# Patient Record
Sex: MALE | Race: WHITE | NOT HISPANIC OR LATINO | Employment: OTHER | URBAN - METROPOLITAN AREA
[De-identification: names, ages, dates, MRNs, and addresses within clinical notes are randomized per-mention and may not be internally consistent; named-entity substitution may affect disease eponyms.]

---

## 2023-04-04 ENCOUNTER — OFFICE VISIT (OUTPATIENT)
Dept: PRIMARY CARE | Facility: CLINIC | Age: 73
End: 2023-04-04
Payer: MEDICARE

## 2023-04-04 VITALS
WEIGHT: 222 LBS | BODY MASS INDEX: 28.49 KG/M2 | SYSTOLIC BLOOD PRESSURE: 142 MMHG | DIASTOLIC BLOOD PRESSURE: 84 MMHG | HEIGHT: 74 IN

## 2023-04-04 DIAGNOSIS — I25.10 CORONARY ARTERY DISEASE INVOLVING NATIVE HEART WITHOUT ANGINA PECTORIS, UNSPECIFIED VESSEL OR LESION TYPE: ICD-10-CM

## 2023-04-04 DIAGNOSIS — I48.11 LONGSTANDING PERSISTENT ATRIAL FIBRILLATION (MULTI): ICD-10-CM

## 2023-04-04 DIAGNOSIS — E78.00 HYPERCHOLESTEROLEMIA: ICD-10-CM

## 2023-04-04 DIAGNOSIS — Z00.00 ROUTINE GENERAL MEDICAL EXAMINATION AT HEALTH CARE FACILITY: Primary | ICD-10-CM

## 2023-04-04 DIAGNOSIS — S31.000A WOUND OF SACRAL REGION, INITIAL ENCOUNTER: ICD-10-CM

## 2023-04-04 PROBLEM — R06.02 SOB (SHORTNESS OF BREATH) ON EXERTION: Status: ACTIVE | Noted: 2023-04-04

## 2023-04-04 PROBLEM — I21.4 NON-ST ELEVATION MYOCARDIAL INFARCTION (NSTEMI) (MULTI): Status: ACTIVE | Noted: 2023-04-04

## 2023-04-04 PROBLEM — I21.19 ST ELEVATION (STEMI) MYOCARDIAL INFARCTION INVOLVING OTHER CORONARY ARTERY OF INFERIOR WALL (MULTI): Status: ACTIVE | Noted: 2023-04-04

## 2023-04-04 PROBLEM — I10 BENIGN ESSENTIAL HTN: Status: ACTIVE | Noted: 2023-04-04

## 2023-04-04 PROBLEM — F32.A DEPRESSION: Status: ACTIVE | Noted: 2023-04-04

## 2023-04-04 PROBLEM — I48.91 ATRIAL FIBRILLATION (MULTI): Status: ACTIVE | Noted: 2023-04-04

## 2023-04-04 PROBLEM — I71.20 THORACIC AORTIC ANEURYSM WITHOUT RUPTURE (CMS-HCC): Status: ACTIVE | Noted: 2023-04-04

## 2023-04-04 PROBLEM — I71.40 AAA (ABDOMINAL AORTIC ANEURYSM) (CMS-HCC): Status: ACTIVE | Noted: 2023-04-04

## 2023-04-04 PROCEDURE — G0439 PPPS, SUBSEQ VISIT: HCPCS | Performed by: STUDENT IN AN ORGANIZED HEALTH CARE EDUCATION/TRAINING PROGRAM

## 2023-04-04 PROCEDURE — 1157F ADVNC CARE PLAN IN RCRD: CPT | Performed by: STUDENT IN AN ORGANIZED HEALTH CARE EDUCATION/TRAINING PROGRAM

## 2023-04-04 PROCEDURE — 3079F DIAST BP 80-89 MM HG: CPT | Performed by: STUDENT IN AN ORGANIZED HEALTH CARE EDUCATION/TRAINING PROGRAM

## 2023-04-04 PROCEDURE — 99214 OFFICE O/P EST MOD 30 MIN: CPT | Performed by: STUDENT IN AN ORGANIZED HEALTH CARE EDUCATION/TRAINING PROGRAM

## 2023-04-04 PROCEDURE — 3077F SYST BP >= 140 MM HG: CPT | Performed by: STUDENT IN AN ORGANIZED HEALTH CARE EDUCATION/TRAINING PROGRAM

## 2023-04-04 PROCEDURE — 1159F MED LIST DOCD IN RCRD: CPT | Performed by: STUDENT IN AN ORGANIZED HEALTH CARE EDUCATION/TRAINING PROGRAM

## 2023-04-04 PROCEDURE — 1170F FXNL STATUS ASSESSED: CPT | Performed by: STUDENT IN AN ORGANIZED HEALTH CARE EDUCATION/TRAINING PROGRAM

## 2023-04-04 PROCEDURE — 1036F TOBACCO NON-USER: CPT | Performed by: STUDENT IN AN ORGANIZED HEALTH CARE EDUCATION/TRAINING PROGRAM

## 2023-04-04 RX ORDER — EZETIMIBE 10 MG/1
10 TABLET ORAL DAILY
COMMUNITY

## 2023-04-04 RX ORDER — APIXABAN 5 MG/1
1 TABLET, FILM COATED ORAL 2 TIMES DAILY
COMMUNITY
Start: 2023-03-28 | End: 2023-12-21 | Stop reason: ALTCHOICE

## 2023-04-04 RX ORDER — NEBIVOLOL 10 MG/1
10 TABLET ORAL DAILY
COMMUNITY
End: 2023-05-30

## 2023-04-04 RX ORDER — ATORVASTATIN CALCIUM 80 MG/1
80 TABLET, FILM COATED ORAL DAILY
COMMUNITY
End: 2023-06-22

## 2023-04-04 RX ORDER — ASPIRIN 81 MG/1
1 TABLET ORAL DAILY
COMMUNITY
Start: 2022-07-20 | End: 2023-12-21 | Stop reason: ALTCHOICE

## 2023-04-04 RX ORDER — RAMIPRIL 10 MG/1
10 CAPSULE ORAL 2 TIMES DAILY
COMMUNITY
End: 2023-06-19

## 2023-04-04 RX ORDER — VENLAFAXINE HYDROCHLORIDE 150 MG/1
150 CAPSULE, EXTENDED RELEASE ORAL DAILY
COMMUNITY
End: 2023-07-05

## 2023-04-04 ASSESSMENT — ACTIVITIES OF DAILY LIVING (ADL)
DOING_HOUSEWORK: INDEPENDENT
TAKING_MEDICATION: INDEPENDENT
BATHING: INDEPENDENT
GROCERY_SHOPPING: INDEPENDENT
MANAGING_FINANCES: INDEPENDENT
DRESSING: INDEPENDENT

## 2023-04-04 ASSESSMENT — ENCOUNTER SYMPTOMS
OCCASIONAL FEELINGS OF UNSTEADINESS: 0
LOSS OF SENSATION IN FEET: 0
DEPRESSION: 0

## 2023-04-04 NOTE — PROGRESS NOTES
"Subjective   Reason for Visit: Kevin Mike is an 72 y.o. male here for a Medicare Wellness visit.     Past Medical, Surgical, and Family History reviewed and updated in chart.    Reviewed all medications by prescribing practitioner or clinical pharmacist (such as prescriptions, OTCs, herbal therapies and supplements) and documented in the medical record.    HPI    Patient Care Team:  Kevin Del Rio MD as PCP - General     Review of Systems    Objective   Vitals:  /84   Ht 1.88 m (6' 2\")   Wt 101 kg (222 lb)   BMI 28.50 kg/m²       Physical Exam    Assessment/Plan   Problem List Items Addressed This Visit    None  Visit Diagnoses     Routine general medical examination at health care facility    -  Primary    Wound of sacral region, initial encounter        Relevant Orders    Referral to Wound Clinic             "

## 2023-04-04 NOTE — PROGRESS NOTES
"Subjective   Patient ID: Kevin Mike is a 72 y.o. male who presents for Medicare Annual Wellness Visit Subsequent (Sore on right buttock, x 1 month/).    HPI   Patient is here for a follow up visit.     Completed his coloscopy and had multiple non-cancerous polyps.     He has noticed a wound on his right gluteal region. He has not been able to get to heal over the last month. It has not been draining, denies trauma, and painful with extended periods of sitting. No history of gluteal wounds.     Review of Systems  12-point ROS reviewed and was negative unless otherwise noted in HPI.    Objective   /84   Ht 1.88 m (6' 2\")   Wt 101 kg (222 lb)   BMI 28.50 kg/m²     Physical Exam  GEN: conversant, NAD  HEENT: PERRL, EOMI, wearing a mask  NECK: supple, no carotid bruits appreciated b/l  CV: S1, S2, RRR  PULM: CTAB  ABD: soft, NT, ND  SKIN: 1cm in diameter, pressure ulcer on right gluteal fold with surrounding erythema  NEURO: no new gross focal deficits  EXT: no sig LE edema  PSYCH: appropriate affect      Assessment/Plan     - CAD s/p CABG in 1990s  - AAA s/p repair 2008  - Afib s/p Watchman 2022  - Thoracic aortic aneurysm being monitored by cardiology and thoracic surgery  - HLD  - HTN  - obesity  - depression  - h/o cigarette smoking  -Colon polyps - repeat colonoscopy 2026  -Stage 2 pressure ulcer with surrounding deep tissue injury     PLAN:  Available records and recent clinic notes reviewed.   - Completed colonoscopy, 5 poyps, repeat in 3 years, 2026.   -Referral to Wound Care for gluteal wound. Discussed weight off loading.   - Planning for cardiac ablation 4/18/2023 for a fib.      #Health maintenance: Cologuard screening repeat 2026.  He received Pneumovax, Shingrix, COVID-19 vaccines. Advised yearly flu shot.     RTC 6 mo or PRN    Trainee role: Resident    I saw and evaluated the patient. I personally obtained the key and critical portions of the history and physical exam or was physically present " for key and critical portions performed by the trainee. I reviewed the trainee's documentation and discussed the patient with the trainee. I agree with the trainee's medical decision making as documented on the trainee's notes.    Kevin Del Rio M.D.

## 2023-04-17 LAB
ANION GAP IN SER/PLAS: 11 MMOL/L (ref 10–20)
CALCIUM (MG/DL) IN SER/PLAS: 9.3 MG/DL (ref 8.6–10.3)
CARBON DIOXIDE, TOTAL (MMOL/L) IN SER/PLAS: 28 MMOL/L (ref 21–32)
CHLORIDE (MMOL/L) IN SER/PLAS: 98 MMOL/L (ref 98–107)
CREATININE (MG/DL) IN SER/PLAS: 0.99 MG/DL (ref 0.5–1.3)
ERYTHROCYTE DISTRIBUTION WIDTH (RATIO) BY AUTOMATED COUNT: 14.1 % (ref 11.5–14.5)
ERYTHROCYTE MEAN CORPUSCULAR HEMOGLOBIN CONCENTRATION (G/DL) BY AUTOMATED: 33.4 G/DL (ref 32–36)
ERYTHROCYTE MEAN CORPUSCULAR VOLUME (FL) BY AUTOMATED COUNT: 99 FL (ref 80–100)
ERYTHROCYTES (10*6/UL) IN BLOOD BY AUTOMATED COUNT: 4.89 X10E12/L (ref 4.5–5.9)
GFR MALE: 81 ML/MIN/1.73M2
GLUCOSE (MG/DL) IN SER/PLAS: 89 MG/DL (ref 74–99)
HEMATOCRIT (%) IN BLOOD BY AUTOMATED COUNT: 48.2 % (ref 41–52)
HEMOGLOBIN (G/DL) IN BLOOD: 16.1 G/DL (ref 13.5–17.5)
LEUKOCYTES (10*3/UL) IN BLOOD BY AUTOMATED COUNT: 6.7 X10E9/L (ref 4.4–11.3)
NRBC (PER 100 WBCS) BY AUTOMATED COUNT: 0 /100 WBC (ref 0–0)
PLATELETS (10*3/UL) IN BLOOD AUTOMATED COUNT: 121 X10E9/L (ref 150–450)
POTASSIUM (MMOL/L) IN SER/PLAS: 4.8 MMOL/L (ref 3.5–5.3)
SODIUM (MMOL/L) IN SER/PLAS: 132 MMOL/L (ref 136–145)
UREA NITROGEN (MG/DL) IN SER/PLAS: 22 MG/DL (ref 6–23)

## 2023-04-18 ENCOUNTER — HOSPITAL ENCOUNTER (OUTPATIENT)
Dept: DATA CONVERSION | Facility: HOSPITAL | Age: 73
End: 2023-04-18
Attending: INTERNAL MEDICINE | Admitting: INTERNAL MEDICINE
Payer: MEDICARE

## 2023-04-18 DIAGNOSIS — Z86.79 PERSONAL HISTORY OF OTHER DISEASES OF THE CIRCULATORY SYSTEM: ICD-10-CM

## 2023-04-18 DIAGNOSIS — Z95.818 PRESENCE OF OTHER CARDIAC IMPLANTS AND GRAFTS: ICD-10-CM

## 2023-04-18 DIAGNOSIS — R06.02 SHORTNESS OF BREATH: ICD-10-CM

## 2023-04-18 DIAGNOSIS — Z95.5 PRESENCE OF CORONARY ANGIOPLASTY IMPLANT AND GRAFT: ICD-10-CM

## 2023-04-18 DIAGNOSIS — E78.5 HYPERLIPIDEMIA, UNSPECIFIED: ICD-10-CM

## 2023-04-18 DIAGNOSIS — I25.119 ATHEROSCLEROTIC HEART DISEASE OF NATIVE CORONARY ARTERY WITH UNSPECIFIED ANGINA PECTORIS (CMS-HCC): ICD-10-CM

## 2023-04-18 DIAGNOSIS — I10 ESSENTIAL (PRIMARY) HYPERTENSION: ICD-10-CM

## 2023-04-18 DIAGNOSIS — I25.2 OLD MYOCARDIAL INFARCTION: ICD-10-CM

## 2023-04-18 DIAGNOSIS — Z79.82 LONG TERM (CURRENT) USE OF ASPIRIN: ICD-10-CM

## 2023-04-18 DIAGNOSIS — I48.19 OTHER PERSISTENT ATRIAL FIBRILLATION (MULTI): ICD-10-CM

## 2023-04-18 DIAGNOSIS — I48.91 UNSPECIFIED ATRIAL FIBRILLATION (MULTI): ICD-10-CM

## 2023-04-18 DIAGNOSIS — Z79.01 LONG TERM (CURRENT) USE OF ANTICOAGULANTS: ICD-10-CM

## 2023-04-18 DIAGNOSIS — Z87.891 PERSONAL HISTORY OF NICOTINE DEPENDENCE: ICD-10-CM

## 2023-04-18 DIAGNOSIS — Z95.1 PRESENCE OF AORTOCORONARY BYPASS GRAFT: ICD-10-CM

## 2023-04-24 LAB
POC ACTIVATED CLOTTING TIME HIGH RANGE: 324 SECONDS (ref 96–152)
POC ACTIVATED CLOTTING TIME HIGH RANGE: 355 SECONDS (ref 96–152)
POC ACTIVATED CLOTTING TIME HIGH RANGE: 365 SECONDS (ref 96–152)

## 2023-05-02 ENCOUNTER — OFFICE VISIT (OUTPATIENT)
Dept: PRIMARY CARE | Facility: CLINIC | Age: 73
End: 2023-05-02
Payer: MEDICARE

## 2023-05-02 VITALS — SYSTOLIC BLOOD PRESSURE: 158 MMHG | DIASTOLIC BLOOD PRESSURE: 96 MMHG

## 2023-05-02 DIAGNOSIS — E78.00 HYPERCHOLESTEROLEMIA: ICD-10-CM

## 2023-05-02 DIAGNOSIS — M54.32 SCIATICA OF LEFT SIDE: ICD-10-CM

## 2023-05-02 DIAGNOSIS — I10 BENIGN ESSENTIAL HTN: ICD-10-CM

## 2023-05-02 DIAGNOSIS — M25.552 PAIN OF LEFT HIP: Primary | ICD-10-CM

## 2023-05-02 PROBLEM — M54.30 SCIATICA: Status: ACTIVE | Noted: 2023-05-02

## 2023-05-02 PROBLEM — M25.559 HIP PAIN: Status: ACTIVE | Noted: 2023-05-02

## 2023-05-02 PROCEDURE — 99213 OFFICE O/P EST LOW 20 MIN: CPT | Performed by: STUDENT IN AN ORGANIZED HEALTH CARE EDUCATION/TRAINING PROGRAM

## 2023-05-02 PROCEDURE — 3077F SYST BP >= 140 MM HG: CPT | Performed by: STUDENT IN AN ORGANIZED HEALTH CARE EDUCATION/TRAINING PROGRAM

## 2023-05-02 PROCEDURE — 3080F DIAST BP >= 90 MM HG: CPT | Performed by: STUDENT IN AN ORGANIZED HEALTH CARE EDUCATION/TRAINING PROGRAM

## 2023-05-02 PROCEDURE — 1160F RVW MEDS BY RX/DR IN RCRD: CPT | Performed by: STUDENT IN AN ORGANIZED HEALTH CARE EDUCATION/TRAINING PROGRAM

## 2023-05-02 PROCEDURE — 1036F TOBACCO NON-USER: CPT | Performed by: STUDENT IN AN ORGANIZED HEALTH CARE EDUCATION/TRAINING PROGRAM

## 2023-05-02 PROCEDURE — 1157F ADVNC CARE PLAN IN RCRD: CPT | Performed by: STUDENT IN AN ORGANIZED HEALTH CARE EDUCATION/TRAINING PROGRAM

## 2023-05-02 PROCEDURE — 1159F MED LIST DOCD IN RCRD: CPT | Performed by: STUDENT IN AN ORGANIZED HEALTH CARE EDUCATION/TRAINING PROGRAM

## 2023-05-02 RX ORDER — METHYLPREDNISOLONE 4 MG/1
TABLET ORAL
Qty: 21 TABLET | Refills: 0 | Status: SHIPPED | OUTPATIENT
Start: 2023-05-02 | End: 2023-05-09

## 2023-05-02 RX ORDER — CYCLOBENZAPRINE HCL 5 MG
5 TABLET ORAL 3 TIMES DAILY PRN
Qty: 30 TABLET | Refills: 0 | Status: SHIPPED | OUTPATIENT
Start: 2023-05-02 | End: 2023-07-01

## 2023-05-02 RX ORDER — LIDOCAINE 50 MG/G
1 PATCH TOPICAL DAILY
Qty: 30 PATCH | Refills: 0 | Status: SHIPPED | OUTPATIENT
Start: 2023-05-02

## 2023-05-02 ASSESSMENT — ENCOUNTER SYMPTOMS
PSYCHIATRIC NEGATIVE: 1
GASTROINTESTINAL NEGATIVE: 1
MUSCULOSKELETAL NEGATIVE: 1
RESPIRATORY NEGATIVE: 1
NUMBNESS: 1
CARDIOVASCULAR NEGATIVE: 1
CONSTITUTIONAL NEGATIVE: 1

## 2023-05-02 NOTE — PROGRESS NOTES
Subjective   Patient ID: Kevin Mike is a 72 y.o. male who presents for Hip Pain (Left hip discomfort,x 5 days).    Patient seen on sick visit.  Regards that he is having some hip pain that started all of a sudden.  Denies any trauma or injuries to the area however it is sometimes excruciating pain.  States that worsens with ambulation, regards a shooting sensation down his leg to about his posterior knee.  States the pain mostly resides in his buttocks, denies any bowel or bladder incontinence or any neurological difficulties.  Denies any additional issues or concerns.         Review of Systems   Constitutional: Negative.    HENT: Negative.     Respiratory: Negative.     Cardiovascular: Negative.    Gastrointestinal: Negative.    Genitourinary: Negative.    Musculoskeletal: Negative.    Skin: Negative.    Neurological:  Positive for syncope and numbness.   Psychiatric/Behavioral: Negative.         Objective   BP (!) 158/96     Physical Exam  Vitals and nursing note reviewed.   Constitutional:       General: He is not in acute distress.     Appearance: Normal appearance.   HENT:      Mouth/Throat:      Mouth: Mucous membranes are moist.      Pharynx: Oropharynx is clear. No oropharyngeal exudate.   Eyes:      Extraocular Movements: Extraocular movements intact.      Pupils: Pupils are equal, round, and reactive to light.   Cardiovascular:      Rate and Rhythm: Normal rate and regular rhythm.      Pulses: Normal pulses.      Heart sounds: Normal heart sounds. No murmur heard.  Pulmonary:      Effort: Pulmonary effort is normal. No respiratory distress.   Abdominal:      General: Abdomen is flat. Bowel sounds are normal. There is no distension.      Tenderness: There is no abdominal tenderness.   Musculoskeletal:         General: Normal range of motion.      Right lower leg: No edema.      Left lower leg: No edema.      Comments: SLR pos on L pain to palpitaiton over posterior spine to the L, pain over area of  piriformis   Skin:     General: Skin is warm and dry.      Capillary Refill: Capillary refill takes less than 2 seconds.   Neurological:      General: No focal deficit present.      Mental Status: He is alert. Mental status is at baseline.      Cranial Nerves: No cranial nerve deficit.      Motor: No weakness.   Psychiatric:         Mood and Affect: Mood normal.         Thought Content: Thought content normal.         Assessment/Plan   Problem List Items Addressed This Visit          Circulatory    Benign essential HTN       Musculoskeletal    Hip pain - Primary    Relevant Medications    methylPREDNISolone (Medrol Dospak) 4 mg tablets    cyclobenzaprine (Flexeril) 5 mg tablet    lidocaine (Lidoderm) 5 % patch    Other Relevant Orders    XR lumbar spine complete 4+ views       Other    Hypercholesterolemia    Sciatica    Relevant Orders    Referral to Pain Medicine     Patient seen on sick evaluation    #Hip pain  #Sciatica  Patient signs and symptoms and physical exam indicative of sciatica-like pain, recommend conservative measures, stretching exercises, ice, Medrol Dosepak cyclobenzaprine as needed.  As well as anti-inflammatories.  No alarm signs noted on exam, recommend follow-up with pain management for injection if no improvement.  Due to chronicity of symptoms, obtain x-ray of hip as well as lower spine.    Return to care as previous scheduled or as needed we will call if symptoms significantly worsen.

## 2023-05-08 ENCOUNTER — DOCUMENTATION (OUTPATIENT)
Dept: PRIMARY CARE | Facility: CLINIC | Age: 73
End: 2023-05-08
Payer: MEDICARE

## 2023-05-08 ENCOUNTER — PATIENT OUTREACH (OUTPATIENT)
Dept: PRIMARY CARE | Facility: CLINIC | Age: 73
End: 2023-05-08
Payer: MEDICARE

## 2023-05-08 RX ORDER — OXYCODONE AND ACETAMINOPHEN 5; 325 MG/1; MG/1
TABLET ORAL
COMMUNITY
Start: 2023-05-07 | End: 2023-12-21 | Stop reason: ALTCHOICE

## 2023-05-08 NOTE — PROGRESS NOTES
Discharge Facility:  [facility] Diagnosis: Atrial fibrillation with RVR; Elevated troponin; Lower back pain   Admission Date:  5/6/23   Discharge Date:   5/7/23    PCP Appointment Date:   5/15/23   Specialist Appointment Date:   Ever MUÑIZ-  Hospital Encounter and Summary: Linked   See discharge assessment below for further details      Engagement  Call Start Time: 1030 (5/8/2023 10:52 AM)    Medications  Medications reviewed with patient/caregiver?: Yes (5/8/2023 10:52 AM)  Is the patient having any side effects they believe may be caused by any medication additions or changes?: No (5/8/2023 10:52 AM)  Does the patient have all medications ordered at discharge?: Yes (5/8/2023 10:52 AM)  Prescription Comments: script for acetaminophen-oxycodone , continued eliquis,-- pharm reviewed meds per [facility]IE (5/8/2023 10:52 AM)  Is the patient taking all medications as directed (includes completed medication regime)?: Yes (5/8/2023 10:52 AM)    Appointments  Does the patient have a primary care provider?: Yes (5/8/2023 10:52 AM)  Care Management Interventions: Verified appointment date/time/provider (5/8/2023 10:52 AM)  Care Management Interventions: Advised patient to keep appointment (5/8/2023 10:52 AM)    Self Management  What is the home health agency?: denies need (5/8/2023 10:52 AM)  Has home health visited the patient within 72 hours of discharge?: Not applicable (5/8/2023 10:52 AM)    Patient Teaching  Does the patient have access to their discharge instructions?: Yes (5/8/2023 10:52 AM)  Care Management Interventions: Reviewed instructions with patient (5/8/2023 10:52 AM)  Is the patient/caregiver able to teach back the hierarchy of who to call/visit for symptoms/problems? PCP, Specialist, Home Health nurse, Urgent Care, ED, 911: Yes (5/8/2023 10:52 AM)    Wrap Up  Is the patient/caregiver familiar with Advance Care Planning?: Yes (5/8/2023 10:52 AM)  Would the patient like more  information on Advance Care Planning?: No (5/8/2023 10:52 AM)  Wrap Up Additional Comments: pt states he is doing well at home pcp appt scheduled for 5/15. contact info provided for any concerns or questions (5/8/2023 10:52 AM)  Call End Time: 1038 (5/8/2023 10:52 AM)

## 2023-05-15 ENCOUNTER — APPOINTMENT (OUTPATIENT)
Dept: PRIMARY CARE | Facility: CLINIC | Age: 73
End: 2023-05-15
Payer: MEDICARE

## 2023-05-23 ENCOUNTER — APPOINTMENT (OUTPATIENT)
Dept: PRIMARY CARE | Facility: CLINIC | Age: 73
End: 2023-05-23
Payer: MEDICARE

## 2023-05-27 DIAGNOSIS — Z00.00 ENCOUNTER FOR GENERAL ADULT MEDICAL EXAMINATION WITHOUT ABNORMAL FINDINGS: ICD-10-CM

## 2023-05-30 RX ORDER — NEBIVOLOL 10 MG/1
TABLET ORAL
Qty: 90 TABLET | Refills: 1 | Status: SHIPPED | OUTPATIENT
Start: 2023-05-30 | End: 2023-11-27

## 2023-06-01 ENCOUNTER — PATIENT OUTREACH (OUTPATIENT)
Dept: CARE COORDINATION | Facility: CLINIC | Age: 73
End: 2023-06-01
Payer: MEDICARE

## 2023-06-01 NOTE — PROGRESS NOTES
Call regarding  hospitalization. No pcp appt  pt having surgery 6/14/23    At time of outreach call the patient feels as if their condition is back to baseline) since last visit.  Contact info provided.. pt did not see pcp for tcm.

## 2023-06-14 ENCOUNTER — HOSPITAL ENCOUNTER (OUTPATIENT)
Dept: DATA CONVERSION | Facility: HOSPITAL | Age: 73
End: 2023-06-15
Attending: STUDENT IN AN ORGANIZED HEALTH CARE EDUCATION/TRAINING PROGRAM | Admitting: STUDENT IN AN ORGANIZED HEALTH CARE EDUCATION/TRAINING PROGRAM
Payer: MEDICARE

## 2023-06-14 DIAGNOSIS — Z79.52 LONG TERM (CURRENT) USE OF SYSTEMIC STEROIDS: ICD-10-CM

## 2023-06-14 DIAGNOSIS — E78.5 HYPERLIPIDEMIA, UNSPECIFIED: ICD-10-CM

## 2023-06-14 DIAGNOSIS — F32.A DEPRESSION, UNSPECIFIED: ICD-10-CM

## 2023-06-14 DIAGNOSIS — M48.061 SPINAL STENOSIS, LUMBAR REGION WITHOUT NEUROGENIC CLAUDICATION: ICD-10-CM

## 2023-06-14 DIAGNOSIS — M71.38 OTHER BURSAL CYST, OTHER SITE: ICD-10-CM

## 2023-06-14 DIAGNOSIS — I25.2 OLD MYOCARDIAL INFARCTION: ICD-10-CM

## 2023-06-14 DIAGNOSIS — F12.10 CANNABIS ABUSE, UNCOMPLICATED: ICD-10-CM

## 2023-06-14 DIAGNOSIS — I10 ESSENTIAL (PRIMARY) HYPERTENSION: ICD-10-CM

## 2023-06-14 DIAGNOSIS — I42.9 CARDIOMYOPATHY, UNSPECIFIED (MULTI): ICD-10-CM

## 2023-06-14 DIAGNOSIS — Z87.891 PERSONAL HISTORY OF NICOTINE DEPENDENCE: ICD-10-CM

## 2023-06-14 DIAGNOSIS — M54.16 RADICULOPATHY, LUMBAR REGION: ICD-10-CM

## 2023-06-14 DIAGNOSIS — Z95.5 PRESENCE OF CORONARY ANGIOPLASTY IMPLANT AND GRAFT: ICD-10-CM

## 2023-06-14 DIAGNOSIS — I25.10 ATHEROSCLEROTIC HEART DISEASE OF NATIVE CORONARY ARTERY WITHOUT ANGINA PECTORIS: ICD-10-CM

## 2023-06-14 DIAGNOSIS — Z95.1 PRESENCE OF AORTOCORONARY BYPASS GRAFT: ICD-10-CM

## 2023-06-14 LAB
PATIENT TEMPERATURE: 37 DEGREES C
POCT ANION GAP, ARTERIAL: 9 MMOL/L (ref 10–25)
POCT BASE EXCESS, ARTERIAL: -0.6 MMOL/L (ref -2–3)
POCT CHLORIDE, ARTERIAL: 104 MMOL/L (ref 98–107)
POCT GLUCOSE, ARTERIAL: 116 MG/DL (ref 74–99)
POCT HCO3, ARTERIAL: 25.4 MMOL/L (ref 22–26)
POCT HEMATOCRIT, ARTERIAL: 40 % (ref 41–52)
POCT HEMOGLOBIN, ARTERIAL: 13.3 G/DL (ref 13.5–17.5)
POCT IONIZED CALCIUM, ARTERIAL: 1.23 MMOL/L (ref 1.1–1.33)
POCT LACTATE, ARTERIAL: 1.1 MMOL/L (ref 0.4–2)
POCT OXY HEMOGLOBIN, ARTERIAL: 97.1 % (ref 94–98)
POCT PCO2, ARTERIAL: 46 MMHG (ref 38–42)
POCT PH, ARTERIAL: 7.35 (ref 7.38–7.42)
POCT PO2, ARTERIAL: 190 MMHG (ref 85–95)
POCT POTASSIUM, ARTERIAL: 4.8 MMOL/L (ref 3.5–5.3)
POCT SO2, ARTERIAL: 100 % (ref 94–100)
POCT SODIUM, ARTERIAL: 134 MMOL/L (ref 136–145)

## 2023-06-15 DIAGNOSIS — Z00.00 ENCOUNTER FOR GENERAL ADULT MEDICAL EXAMINATION WITHOUT ABNORMAL FINDINGS: ICD-10-CM

## 2023-06-15 LAB
ALBUMIN (G/DL) IN SER/PLAS: 3.8 G/DL (ref 3.4–5)
ANION GAP IN SER/PLAS: 16 MMOL/L (ref 10–20)
CALCIUM (MG/DL) IN SER/PLAS: 9.3 MG/DL (ref 8.6–10.6)
CARBON DIOXIDE, TOTAL (MMOL/L) IN SER/PLAS: 25 MMOL/L (ref 21–32)
CHLORIDE (MMOL/L) IN SER/PLAS: 100 MMOL/L (ref 98–107)
CREATININE (MG/DL) IN SER/PLAS: 1.04 MG/DL (ref 0.5–1.3)
ERYTHROCYTE DISTRIBUTION WIDTH (RATIO) BY AUTOMATED COUNT: 14.8 % (ref 11.5–14.5)
ERYTHROCYTE MEAN CORPUSCULAR HEMOGLOBIN CONCENTRATION (G/DL) BY AUTOMATED: 32.6 G/DL (ref 32–36)
ERYTHROCYTE MEAN CORPUSCULAR VOLUME (FL) BY AUTOMATED COUNT: 102 FL (ref 80–100)
ERYTHROCYTES (10*6/UL) IN BLOOD BY AUTOMATED COUNT: 3.6 X10E12/L (ref 4.5–5.9)
GFR MALE: 76 ML/MIN/1.73M2
GLUCOSE (MG/DL) IN SER/PLAS: 98 MG/DL (ref 74–99)
HEMATOCRIT (%) IN BLOOD BY AUTOMATED COUNT: 36.8 % (ref 41–52)
HEMOGLOBIN (G/DL) IN BLOOD: 12 G/DL (ref 13.5–17.5)
LEUKOCYTES (10*3/UL) IN BLOOD BY AUTOMATED COUNT: 12.5 X10E9/L (ref 4.4–11.3)
NRBC (PER 100 WBCS) BY AUTOMATED COUNT: 0 /100 WBC (ref 0–0)
PHOSPHATE (MG/DL) IN SER/PLAS: 4.6 MG/DL (ref 2.5–4.9)
PLATELETS (10*3/UL) IN BLOOD AUTOMATED COUNT: 143 X10E9/L (ref 150–450)
POTASSIUM (MMOL/L) IN SER/PLAS: 4.7 MMOL/L (ref 3.5–5.3)
SODIUM (MMOL/L) IN SER/PLAS: 136 MMOL/L (ref 136–145)
UREA NITROGEN (MG/DL) IN SER/PLAS: 21 MG/DL (ref 6–23)

## 2023-06-16 LAB
ALBUMIN (G/DL) IN SER/PLAS: NORMAL
ANION GAP IN SER/PLAS: NORMAL
CALCIUM (MG/DL) IN SER/PLAS: NORMAL
CARBON DIOXIDE, TOTAL (MMOL/L) IN SER/PLAS: NORMAL
CHLORIDE (MMOL/L) IN SER/PLAS: NORMAL
CREATININE (MG/DL) IN SER/PLAS: NORMAL
ERYTHROCYTE DISTRIBUTION WIDTH (RATIO) BY AUTOMATED COUNT: NORMAL
ERYTHROCYTE MEAN CORPUSCULAR HEMOGLOBIN CONCENTRATION (G/DL) BY AUTOMATED: NORMAL
ERYTHROCYTE MEAN CORPUSCULAR VOLUME (FL) BY AUTOMATED COUNT: NORMAL
ERYTHROCYTES (10*6/UL) IN BLOOD BY AUTOMATED COUNT: NORMAL
GFR FEMALE: NORMAL
GFR MALE: NORMAL
GLUCOSE (MG/DL) IN SER/PLAS: NORMAL
HEMATOCRIT (%) IN BLOOD BY AUTOMATED COUNT: NORMAL
HEMOGLOBIN (G/DL) IN BLOOD: NORMAL
LEUKOCYTES (10*3/UL) IN BLOOD BY AUTOMATED COUNT: NORMAL
NRBC (PER 100 WBCS) BY AUTOMATED COUNT: NORMAL
PHOSPHATE (MG/DL) IN SER/PLAS: NORMAL
PLATELETS (10*3/UL) IN BLOOD AUTOMATED COUNT: NORMAL
POTASSIUM (MMOL/L) IN SER/PLAS: NORMAL
SODIUM (MMOL/L) IN SER/PLAS: NORMAL
UREA NITROGEN (MG/DL) IN SER/PLAS: NORMAL

## 2023-06-19 RX ORDER — RAMIPRIL 10 MG/1
CAPSULE ORAL
Qty: 180 CAPSULE | Refills: 1 | Status: SHIPPED | OUTPATIENT
Start: 2023-06-19 | End: 2023-12-11

## 2023-06-22 DIAGNOSIS — Z00.00 ENCOUNTER FOR GENERAL ADULT MEDICAL EXAMINATION WITHOUT ABNORMAL FINDINGS: ICD-10-CM

## 2023-06-22 RX ORDER — ATORVASTATIN CALCIUM 80 MG/1
TABLET, FILM COATED ORAL
Qty: 90 TABLET | Refills: 1 | Status: SHIPPED | OUTPATIENT
Start: 2023-06-22 | End: 2023-12-15

## 2023-07-03 DIAGNOSIS — F32.A DEPRESSION, UNSPECIFIED: ICD-10-CM

## 2023-07-05 RX ORDER — VENLAFAXINE HYDROCHLORIDE 150 MG/1
CAPSULE, EXTENDED RELEASE ORAL
Qty: 90 CAPSULE | Refills: 0 | Status: SHIPPED | OUTPATIENT
Start: 2023-07-05 | End: 2023-10-03 | Stop reason: SDUPTHER

## 2023-07-14 ENCOUNTER — PATIENT OUTREACH (OUTPATIENT)
Dept: CARE COORDINATION | Facility: CLINIC | Age: 73
End: 2023-07-14
Payer: MEDICARE

## 2023-07-14 NOTE — PROGRESS NOTES
Call regarding appt. with ortho on 6/14/23 after hospitalization.  At time of outreach call the patient feels as if their condition has (improved since last visit.  Reviewed appointment with the pt and addressed any questions or concerns.

## 2023-09-07 VITALS — HEIGHT: 74 IN | WEIGHT: 212.74 LBS | BODY MASS INDEX: 27.3 KG/M2

## 2023-09-14 NOTE — H&P
History of Present Illness:   History Present Illness:  Reason for surgery: Atrial fibrillation   HPI:    Mr. Mike is a 72 year old male with a past medical history of persistent atrial fibrillation s/p Watchman, CAD s/p CABG and stents, AAA s/p repair, HTN, and HPL.  The echo reported a normal EF. He has been on DAPT and then transitioned to Eliquis. He presents for radiofrequency ablation of atrial fibrillation.    The indications, risks, benefits, alternatives, and details of the procedure were explained to the patient who expressed understanding of the risks including but are not limited to: pain, bleeding, and infection for which the risk is less than 1%. More  serious risks, including cardiac perforation and tamponade, vascular trauma, pulmonary vein stenosis, atrio-esophageal fistula, diaphragmatic paralysis, life-threatening arrhythmia, need for permanent pacemaker, stroke, heart attack, and/or death, for  which the overall risk ranges 0.1-1%. After all questions were answered, the patient provided informed and written consent.      Allergies:        Allergies:  ·  No Known Allergies :     Home Medication Review:   Home Medications Reviewed: yes     Impression/Procedure:   ·  Impression and Planned Procedure: Radiofrequency ablation of atrial fibrillation       ERAS (Enhanced Recovery After Surgery):  ·  ERAS Patient: no       Physical Exam by System:    Constitutional: Well developed, awake/alert/oriented  x3, no distress, alert and cooperative   ENMT: mucous membranes moist, no lesions seen   Head/Neck: Neck supple, no JVD, trachea midline   Respiratory/Thorax: Patent airway, CTAB, normal breath  sounds with good chest expansion   Cardiovascular: Irregular rhythm, no murmurs, normal  S 1and S 2   Gastrointestinal: Nondistended, soft, non-tender,  no rebound tenderness or guarding, +BS   Extremities: Normal extremities, no edema, no clubbing   Neurological: Alert and oriented x3   Psychological:  Appropriate mood and behavior   Skin: Surgical scar at the chest and abdomen. Warm  and dry, no lesions, no rashes     Airway/Sedation Assessment:  ·  Assmentment by Anesthesia See anesthesia airway/sedation assessment.     Consent:   COVID-19 Consent:  ·  COVID-19 Risk Consent Surgeon has reviewed key risks related to the risk of gloria COVID-19 and if they contract COVID-19 what the risks are.     Coronavirus Attestation of Need for Surgery:  ·  COVID-19 Surgery / Procedure Attestation: Select all criteria that apply Risk of rapidly worsening to severe symptoms if delayed     Attestation:   Note Completion:  I am a:  Resident/Fellow   Attending Attestation I saw and evaluated the patient.  I personally obtained the key and critical portions of the history and physical exam or was physically present for key and  critical portions performed by the resident/fellow. I reviewed the resident/fellow?s documentation and discussed the patient with the resident/fellow.  I agree with the resident/fellow?s medical decision making as documented in the note.     I personally evaluated the patient on 18-Apr-2023         Electronic Signatures:  Artem Markham)  (Signed 18-Apr-2023 15:46)   Authored: Note Completion   Co-Signer: History of Present Illness, Allergies, Home Medication Review, Impression/Procedure, ERAS, Physical Exam, Consent, Note Completion  Hira Hanks (Fellow))  (Signed 18-Apr-2023 07:20)   Authored: History of Present Illness, Allergies, Home  Medication Review, Impression/Procedure, ERAS, Physical Exam, Consent, Note Completion      Last Updated: 18-Apr-2023 15:46 by Artem Markham)

## 2023-09-30 NOTE — PROGRESS NOTES
Service: Neurosurgery     Subjective Data:   JENAE ARMANDO is a 72 year old Male who is Hospital Day # 2 and POD #1 for 1.  L4-L5 OPEN TLIF with screws and synovial cyst removal  Syed table, C Arm x's 1, Navigation, Medtronic, sia [place]  PL/L40 ;    Objective Data:     Objective Information:      T   P  R  BP   MAP  SpO2   Value  36.1  70  18  116/73      93%  Date/Time 6/15 4:29 6/15 4:29 6/15 4:29 6/15 4:29    6/15 4:29  Range  (36C - 36.1C )  (69 - 70 )  (18 - 18 )  (109 - 133 )/ (73 - 86 )    (93% - 99% )      Pain reported at 6/14 15:00: 3 = Mild    Physical Exam by System:    Neurological: A&Ox3  RUE D5, B5, T5, HG5, IO5  LUE D5, B5, T5, HG5, IO5  RLE HF 5, KE5, PF5, EHL5, DF5  LLE HF 5, KE5, PF5, EHL5, DF5  incision w/ minimal strikethrough on top otherwise intact     Recent Lab Results:    Results:      ---------- Recent Arterial Blood Gas Results----------     6/14/2023 10:56  pO2 190  pH 7.35  pCO2 46  SO2 100  Base Excess -0.6null    Assessment and Plan:   Code Status:  ·  Code Status Full Code     Advance Care Planning:  Advance Care Planning: Patient didn't wish to or  was unable to provide advance care plan     Assessment:    Pt is a 73 yo M w/ H/o CAD s/p CABG (on ASA), afib s/p Watchman and recent ablation (on Eliquis), remote AAA repair, p/w 3 weeks severe LLE radiculopathy and back  pain, MRI with L L4-5 synovial cyst, L4-5 mobile spondylolisthesis, 6/14 s/p L4-5 TLIF, uprights hardware in posn    Plan:  floor  keep drain  ASA/Eliquis restart POD 7  diet  home meds  PTOT  SCDs, SQH    Please page Neurosurgery pager [[phone]] with any questions or concerns during the day.  Progress note authored by On Call resident. [facility] messages will have delayed responses.     Attestation:   Note Completion:  I am a:  Resident/Fellow   Attending Attestation I saw and evaluated the patient.  I personally obtained the key and critical portions of the history and physical exam or was physically  present for key and  critical portions performed by the resident/fellow. I reviewed the resident/fellow?s documentation and discussed the patient with the resident/fellow.  I agree with the resident/fellow?s medical decision making as documented in the note.     I personally evaluated the patient on 15-Dax-2023         Electronic Signatures:  Elizabeth Lentz (Resident))  (Signed 15-Dax-2023 05:29)   Authored: Service, Subjective Data, Objective Data, Assessment  and Plan, Note Completion  Rojas Jones)  (Signed 15-Dax-2023 08:08)   Authored: Note Completion   Co-Signer: Service, Subjective Data, Objective Data, Assessment and Plan, Note Completion      Last Updated: 15-Dax-2023 08:08 by Rojas Jones)

## 2023-09-30 NOTE — DISCHARGE SUMMARY
Send Summary:   Discharge Summary Providers:  Provider Role Provider Name   · Referring Chris West   · Attending Rojas Jones   · Primary Kevin Del Rio       Note Recipients: Chris West, Kevin Franco MD - [phone] [preferred]       Discharge:    Summary:   Admission Date: .14-Jun-2023 06:04:00   Discharge Date: 15-Dax-2023   Attending Physician at Discharge: Rojas Jones   Admission Reason: Synovial cyst of lumbar spine   Final Discharge Diagnoses: Synovial cyst of lumbar  spine   Procedures: Date: 14-Jun-2023 12:46:00  Procedure Name: 1.  L4-L5 OPEN TLIF with screws and synovial cyst removal  Syed table, C Arm x's 1, Navigation, Medtronic, [place], catalyft PL/L40   Condition at Discharge: Satisfactory   Disposition at Discharge: .Home   Vital Signs:        T   P  R  BP   MAP  SpO2   Value  36.1  67  18  118/74      97%  Date/Time 6/15 11:19 6/15 11:19 6/15 11:19 6/15 11:19    6/15 11:19  Range  (36C - 36.4C )  (67 - 72 )  (18 - 18 )  (109 - 133 )/ (65 - 86 )    (93% - 99% )    Date:            Weight/Scale Type:  Height:   14-Jun-2023 06:36  96.5  kg         187.9  cm  Physical Exam:         General: in bed, awake, no distress       HEENT: PERRL; EOMI       Neurological: A&Ox3  RUE D5, B5, T5, HG5, IO5  LUE D5, B5, T5, HG5, IO5  RLE HF 5, KE5, PF5, EHL5, DF5  LLE HF 5, KE5, PF5, EHL5, DF5  incision w/ minimal strikethrough on top otherwise intact       Cardiac: regular rate and rhythm       Resp: respirations easy and regular       Abd:  soft, non-tender, non-distended        Extr: no edema; pulses palpable        Skin: warm, dry, intact.         Psych: appropriate and cooperative   Hospital Course:    Patient is a 72 year old male, medical history of CABG (on ASA), afib s/p Watchman and recent ablation (on Eliquis), remote AAA repair, p/w 3 weeks severe LLE radiculopathy  and back pain, MRI with L L4-5 synovial cyst, L4-5 mobile spondylolisthesis, presenting for elective L4-5 TLIF and  synovial cyst removal.     Hospital course as follows:  6/14 s/p L4-5 TLIF, uprights hardware in position  6/15 drain auto dc'd      PT/OT evaluated patient without recommendation for further therapy at discharge.      Discharge Information:    and Continuing Care:   Lab Results - Pending:    None  Radiology Results - Pending: Xray Fluoro Spine at  14-Jun-2023 12:23:00   Discharge Instructions:    Activity:           activity as tolerated.          May shower..            May not drive while taking narcotics.  and until cleared at follow up          No pushing, pulling, or lifting objects greater than 10 pounds until follow-up visit.            Weight-bearing Instructions: weight-bearing as tolerated.            Activity as tolerated          Increase your activity slowly. Walking is good exercise activity after surgery. Gradually increase the time and distance you walk each day. You may walk as much as is comfortable for you.          Pain or discomfort is a guide to cut back on your activity          No heavy lifting (more than 10 pounds), pulling or pushing activity until cleared by MD at follow-up appointment          You may shower immediately after discharge          DO NOT allow direct water spray on your incision          No tub soaking          You may ride in a car with your seat belt on    Nutrition/Diet:           regular    Wound Care:           Wound Site:   Lumbar Spine Surgical Incision          Other Instructions:   -Please purchase dry dressings at any pharmacy to cover incision.  This dressing will need to be changed daily and as needed until your wound check appointment at approximately 2 weeks from date of surgery at your wound check  appointment.  Please call Dr. Jones's office with any questions.          Inspect your incision daily for signs of infection: redness, swelling, drainage and foul discharge          You may wash the hair around the incision          DO NOT soak in a tub or swim  until incision is completely healed.  This may take approximately 4 weeks          DO NOT scrub or rub the incision.  You may gently pat the incision          DO NOT pick off scabs, or old blood from the incision site.  The incision should heal naturally on its own          No Lotions, creams, gels or powders. No hair products, conditioner, rubbing alcohol, hydrogen peroxide or ointments on or around your incision          There may be some tenderness, bruising and a small amount of swelling along the incision line.  This will gradually go away over the next several weeks    Additional Orders:           Additional Instructions:   -Do not take any over the counter or prescription NSAID medications for at least three months or until cleared by your Neurosurgeon.  These medications include: Motrin, Ibuprofen, Advil, Aleve, Naproxen, Mobic etc..   This will allow for proper and adequate bone fusion. Please call your Neurosurgeon's office if you have any questions.     -If you smoke or use products containing nicotine your are advised to stop.  Nicotine may interfere with your bone fusion and hinder wound healing.     - Please monitor your drain site of for excessive drainage.  If excessive drainage (saturating/soaking through a guaze every 4 hours continues over the course of 12 hours), please call the office at [phone].  Please note the type of drainage (color,  consistency, amount, etc.) to better help the office make an assessment.   Please also note if the drainage appears to be slowing down. Changes in position may also cause an initial gush of fluid that slows down with movement.    - Do not remove, scrub or pick at the purple glue on your incision.  This is dermabond and was used to close your surgical incision. It will wear away naturally on it's own in approximately two weeks.     -DO NOT RESUME YOUR Aspirin and Eliquis until POD #7 - 6/21/2023.        Follow Up Appointments:    Follow-Up - Neurosurgeon:            Physician/Dept/Service:   Neurosurgeon   Wound Check: Chris West PA-C          Scheduled Date/Time:   03-Jul-2023 13:30          Location:   [place] [address], [place] [address]          Phone Number:   [phone]    Follow-Up - Spine Surgeon:           Physician/Dept/Service:   Surgeon   Dr. Rojas Jones          Scheduled Date/Time:   01-Sep-2023 13:00          Location:   [place] [address], [place] [address]          Phone Number:   [phone]    Discharge Medications: Home Medication   atorvastatin 80 mg oral tablet - 1 tab(s) oral once a day (at bedtime)  Multiple Vitamins with Minerals oral tablet - 1 tab(s) oral once a day  nebivolol 10 mg oral tablet - 1 tab(s) oral once a day  venlafaxine 150 mg oral capsule, extended release - 1 cap(s) oral once a day  ezetimibe 10 mg oral tablet - 1 tab(s) oral once a day  pregabalin 75 mg oral capsule - 1 cap(s) orally 3 times a day  acetaminophen 325 mg oral tablet - 2 tab(s) orally every 6 hours, as needed for post operative pain  ramipril 10 mg oral tablet - 1 tab(s) oral once a day  apixaban 5 mg oral tablet - 1 tab(s) oral 2 times a day  -DO NOT RESUME UNTIL 6/21/2023  sennosides-docusate 8.6 mg-50 mg oral tablet - 2 tab(s) orally 2 times a day   -Take while using oxycodone for post operative pain to prevent constipation  cyclobenzaprine 5 mg oral tablet - 1 tab(s) orally 3 times a day, as needed while having post op muscle spasms  Angeles - 1 cap(s)  once a day     PRN Medication   oxyCODONE 5 mg oral tablet - 1 tab(s) orally every 6 hours, As Needed for severe post operative pain  ICD 10: G89.18     DNR Status:   ·  Code Status Code Status order at time of discharge: Full Code     Attestation:   Note Completion:  Provider/Team Pager # BETZY Harris-CNP  pager: [phone]  Total time spent today was 45 minutes, all of which was spent coordinating  patient's discharge         Electronic Signatures:  Luna Tabares (APRN-CNP)  (Signed 15-Dax-2023 14:22)   Authored: Send Summary, Summary Content, Ongoing Care,  DNR Status, Note Completion      Last Updated: 15-Dax-2023 14:22 by Luna Tabares (APRN-CNP)

## 2023-09-30 NOTE — H&P
History & Physical Reviewed:   I have reviewed the History and Physical dated:  05-Jun-2023   History and Physical reviewed and relevant findings noted. Patient examined to review pertinent physical  findings.: No significant changes   Home Medications Reviewed: no changes noted   Allergies Reviewed: no changes noted       ERAS (Enhanced Recovery After Surgery):  ·  ERAS Patient: no     Consent:   COVID-19 Consent:  ·  COVID-19 Risk Consent Surgeon has reviewed key risks related to the risk of gloria COVID-19 and if they contract COVID-19 what the risks are.     Attestation:   Note Completion:  I am a:  Resident/Fellow   Attending Attestation I saw and evaluated the patient.  I personally obtained the key and critical portions of the history and physical exam or was physically present for key and  critical portions performed by the resident/fellow. I reviewed the resident/fellow?s documentation and discussed the patient with the resident/fellow.  I agree with the resident/fellow?s medical decision making as documented in the note.     I personally evaluated the patient on 14-Jun-2023         Electronic Signatures:  Ani Olivares (Resident))  (Signed 13-Jun-2023 22:09)   Authored: History & Physical Reviewed, ERAS, Consent,  Note Completion  Rojas Jones)  (Signed 14-Jun-2023 09:56)   Authored: Note Completion   Co-Signer: History & Physical Reviewed, ERAS, Consent, Note Completion      Last Updated: 14-Jun-2023 09:56 by Rojas Jones)

## 2023-10-02 DIAGNOSIS — F32.A DEPRESSION, UNSPECIFIED: ICD-10-CM

## 2023-10-02 NOTE — OP NOTE
PROCEDURE DETAILS    Preoperative Diagnosis:  Right lumbar radiculopathy, M54.16    Postoperative Diagnosis:  lumbar stenosis  Surgeon: Rojas Jones  Resident/Fellow/Other Assistant: Rose King    Procedure:  1.  L4-L5 OPEN TLIF with screws and synovial cyst removal  Syed table, C Arm x's 1, Navigation, [employer], Kathrine, sia PL/L40     Anesthesia: Newton, Luna  Estimated Blood Loss: 400cc  Findings: Excellent hardware placement and reduction of anterolisthesis  Specimens(s) Collected: no,     Drains and/or Catheters: 10 round hemovac        Operative Report:   DESCRIPTION OF THE OPERATION: The patient was brought to the operating room theater. A verbal huddle was performed confirming the patient by name, date of birth,  medical record number, site of surgery. After all team members were in agreement, they underwent anesthesia induction without complication. Two large bore IVs were placed as well as endotracheal tube. Perioperative antibiotic administration was confirmed.  The patient was flipped prone onto a Syed table with posts and their back was prepped and draped in a sterile fashion. Using lateral fluoroscopy we confirmed our levels of interest with a spinal needle and an incision was marked out in the midline.    The skin was then infiltrated with lidocaine with epinephrine and next began procedure by using a 10 blade.  We incised the skin along the incision and using Bovie electrocautery and blunt dissection exposed the posterior elements of the spine including  the spinous process, lamina and joints.  After complete exposure and hemostasis with bipolar electrocautery and FloSeal we then turned our attention to confirm our levels of interest with lateral fluoroscopy.  After confirmation of our levels of interest  we then turned our attention to the cannulation of the pedicles and instrumentation.    To begin we placed a spinous process clamp and an intraoperative CT scan was  obtained of the lumbar spine for stereotactic navigation. This was merged in 3-Dimensional space and registered to the patient. Using a stereotactic probe we marked the entry  sites for pedicle cannulation at L4 and L5. Using a high speed drill we then cannulated pedicles with a navigated drill and we dilated the pedicles to 5.5mm with a navigated tap and 7.5 x 55mm screws were then placed into the pedicles without complication.     After pedicle screw placement, we then turned our attention to the decompression. To begin a Leksell rongeur and a high speed drill was used to remove the spinous process and lamina of L4-L5 and L5-S1 exposing the underlying ligamentum flavum. We also  performed a total facetectomy with a high speed drill making an osteotomy with a high speed drill to disarticulate the inferior articulating process and superior articulating process at L4-5. After removal of the bone we used Kerrison rongeurs, curettes,  and blunt dissection to free the traversing and exiting nerve roots.  There was a large synovial cyst that was removed from the L4-5 joint. We peeled it off the L5 traversing nerve root all the way down to the S1 nerve root. We spent a considerable amount  of time decompressing the nerves across both L4-5 and L5-S1. After removal of all of the soft tissue and bone we confirmed adequate decompression and then achieved hemostasis with a bipolar and Floseal. The traversing and exiting nerve roots were free  in all planes.     After decompression was completed we then turned our attention to placement of the interbody into the disc space.  We used leonides, rongeurs, and pituitary instruments to perform a discectomy. After completion of the discectomy and end plate preparation  for arthrodesis, we packed the disc space with autologous bone graft mixed with allograft comprised of demineralized bone matrix and cancellous chips. Then using live lateral fluoroscopy a 7mm expandable cage was  inserted into the disc space. After adequate  placement of our cage we then placed more autologous bone graft mixed with demineralized bone matrix into the disc space for fusion. After placement of our cage and fusion graft we then completed our instrumentation with placement of rods. We performed  reduction to restore alignment and compression across our hardware to achieve lordosis and correct deformity at this level. We then placed locking caps and these were final tightened and secured it into place. We then performed posterolateral arthrodesis  on the remaining posterior elements of the spine exposed across the transverse processes and remaining aspects of the joint. We then packed more bone graft posterolaterally.    Next 1 gram of vancomycin powder was placed over the hardware, a 10Fr round drain was tunneled in a subfascial fashion, and hemostasis was achieved with Floseal and bipolar cautery. We then acquired final x-rays of our hardware confirming placement at  our level of interest L4-5 and accurate placement within the bony elements and disc space. The muscle and fascia were approximated with 0 Vicryl sutures and then 2-0 Vicryl sutures were placed into the dermal layers and dermabond placed onto the skin.  The drain was secured with a nylon stitch and the patient was then flipped supine and they were extubated and returned to PACU in stable condition.       Rojas Jones M.D.  Director of Spine [place]  [facility]   of Neurological Surgery  [facility] [employer]  Office: [phone]  Fax: [phone]                        Attestation:   Note Completion:  Attending Attestation I was present for key portions of the procedure and the procedure lasted longer than 5 minutes.    I am a: Resident/Fellow         Electronic Signatures:  Ani Olivares (MD (Resident))  (Signed 14-Jun-2023 12:48)   Authored: Post-Operative Note,  Chart Review, Note Completion  Rojas Jones)  (Signed 14-Jun-2023 14:51)   Authored: Post-Operative Note, Chart Review, Note Completion   Co-Signer: Post-Operative Note, Chart Review, Note Completion      Last Updated: 14-Jun-2023 14:51 by Rojas Jones)

## 2023-10-03 ENCOUNTER — OFFICE VISIT (OUTPATIENT)
Dept: PRIMARY CARE | Facility: CLINIC | Age: 73
End: 2023-10-03
Payer: MEDICARE

## 2023-10-03 VITALS
DIASTOLIC BLOOD PRESSURE: 72 MMHG | WEIGHT: 211 LBS | BODY MASS INDEX: 27.08 KG/M2 | SYSTOLIC BLOOD PRESSURE: 118 MMHG | HEIGHT: 74 IN

## 2023-10-03 DIAGNOSIS — F32.A DEPRESSION, UNSPECIFIED: ICD-10-CM

## 2023-10-03 DIAGNOSIS — Z00.00 ROUTINE GENERAL MEDICAL EXAMINATION AT HEALTH CARE FACILITY: Primary | ICD-10-CM

## 2023-10-03 DIAGNOSIS — Z00.00 MEDICARE ANNUAL WELLNESS VISIT, SUBSEQUENT: ICD-10-CM

## 2023-10-03 DIAGNOSIS — E78.00 HYPERCHOLESTEROLEMIA: ICD-10-CM

## 2023-10-03 DIAGNOSIS — I10 BENIGN ESSENTIAL HTN: ICD-10-CM

## 2023-10-03 PROCEDURE — 3078F DIAST BP <80 MM HG: CPT | Performed by: STUDENT IN AN ORGANIZED HEALTH CARE EDUCATION/TRAINING PROGRAM

## 2023-10-03 PROCEDURE — 1159F MED LIST DOCD IN RCRD: CPT | Performed by: STUDENT IN AN ORGANIZED HEALTH CARE EDUCATION/TRAINING PROGRAM

## 2023-10-03 PROCEDURE — 3074F SYST BP LT 130 MM HG: CPT | Performed by: STUDENT IN AN ORGANIZED HEALTH CARE EDUCATION/TRAINING PROGRAM

## 2023-10-03 PROCEDURE — 1036F TOBACCO NON-USER: CPT | Performed by: STUDENT IN AN ORGANIZED HEALTH CARE EDUCATION/TRAINING PROGRAM

## 2023-10-03 PROCEDURE — 3008F BODY MASS INDEX DOCD: CPT | Performed by: STUDENT IN AN ORGANIZED HEALTH CARE EDUCATION/TRAINING PROGRAM

## 2023-10-03 PROCEDURE — 99213 OFFICE O/P EST LOW 20 MIN: CPT | Performed by: STUDENT IN AN ORGANIZED HEALTH CARE EDUCATION/TRAINING PROGRAM

## 2023-10-03 PROCEDURE — 99397 PER PM REEVAL EST PAT 65+ YR: CPT | Performed by: STUDENT IN AN ORGANIZED HEALTH CARE EDUCATION/TRAINING PROGRAM

## 2023-10-03 PROCEDURE — 1160F RVW MEDS BY RX/DR IN RCRD: CPT | Performed by: STUDENT IN AN ORGANIZED HEALTH CARE EDUCATION/TRAINING PROGRAM

## 2023-10-03 PROCEDURE — 1170F FXNL STATUS ASSESSED: CPT | Performed by: STUDENT IN AN ORGANIZED HEALTH CARE EDUCATION/TRAINING PROGRAM

## 2023-10-03 PROCEDURE — 1125F AMNT PAIN NOTED PAIN PRSNT: CPT | Performed by: STUDENT IN AN ORGANIZED HEALTH CARE EDUCATION/TRAINING PROGRAM

## 2023-10-03 RX ORDER — VENLAFAXINE HYDROCHLORIDE 150 MG/1
150 CAPSULE, EXTENDED RELEASE ORAL DAILY
Qty: 90 CAPSULE | Refills: 1 | Status: SHIPPED | OUTPATIENT
Start: 2023-10-03 | End: 2023-10-05 | Stop reason: SDUPTHER

## 2023-10-03 RX ORDER — VENLAFAXINE HYDROCHLORIDE 150 MG/1
CAPSULE, EXTENDED RELEASE ORAL
Qty: 90 CAPSULE | Refills: 0 | OUTPATIENT
Start: 2023-10-03

## 2023-10-03 ASSESSMENT — ENCOUNTER SYMPTOMS
LOSS OF SENSATION IN FEET: 0
OCCASIONAL FEELINGS OF UNSTEADINESS: 0
DEPRESSION: 0

## 2023-10-03 ASSESSMENT — ACTIVITIES OF DAILY LIVING (ADL)
DRESSING: INDEPENDENT
DOING_HOUSEWORK: INDEPENDENT
TAKING_MEDICATION: INDEPENDENT
BATHING: INDEPENDENT
GROCERY_SHOPPING: INDEPENDENT
MANAGING_FINANCES: INDEPENDENT

## 2023-10-03 NOTE — PROGRESS NOTES
"Subjective   Reason for Visit: Kevin Mike is an 73 y.o. male here for a Medicare Wellness visit.          Reviewed all medications by prescribing practitioner or clinical pharmacist (such as prescriptions, OTCs, herbal therapies and supplements) and documented in the medical record.    HPI    Patient Care Team:  Kevin Del Rio MD as PCP - General     Review of Systems    Objective   Vitals:  /72   Ht 1.88 m (6' 2\")   Wt 95.7 kg (211 lb)   BMI 27.09 kg/m²       Physical Exam    Assessment/Plan   Problem List Items Addressed This Visit    None  Visit Diagnoses       Routine general medical examination at health care facility    -  Primary    Depression, unspecified            Routine fu and wellness exam.    Trainee role: Resident    I saw and evaluated the patient. I personally obtained the key and critical portions of the history and physical exam or was physically present for key and critical portions performed by the trainee. I reviewed the trainee's documentation and discussed the patient with the trainee. I agree with the trainee's medical decision making as documented on the trainee's notes.    Kevin Del Rio M.D.         "

## 2023-10-03 NOTE — PROGRESS NOTES
"Subjective   Patient ID: Kevin Mike is a 73 y.o. male who presents for Medicare Annual Wellness Visit Subsequent.    HPI     Pt presenting to clinic for follow up. Notes he has some lower extremity cramping especially at bed time that improves with activity otherwise pt feels he is in a good state of health. Pt recently underwent spinal surgery 6/2023 with no complications and doing well post-op     Review of Systems    10 point ros negative aside from HPI    Objective   /72   Ht 1.88 m (6' 2\")   Wt 95.7 kg (211 lb)   BMI 27.09 kg/m²     Physical Exam    Physical Exam:  General:  Pleasant and cooperative. No apparent distress.  HEENT:  Normocephalic, atraumatic, mucus membranes moist.   Neck:  Trachea midline.  No JVD.    Chest:  Clear to auscultation bilaterally. No wheezes, rales, or rhonchi.  CV:  Regular rate and rhythm.  Positive S1/S2.   Abdomen: Bowel sounds present in all four quadrants, abdomen is soft, non-tender, non-distended.  Extremities:  No lower extremity edema or cyanosis. B/L LE varicose veins with no venous stasis ulcers.   Neurological:  AAOx3. No focal deficits.  Skin:  Warm and dry.      Assessment/Plan     - CAD s/p CABG in 1990s  - AAA s/p repair 2008  - Afib s/p Watchman 2022  - Thoracic aortic aneurysm being monitored by cardiology and thoracic surgery  -S/P   L4-L5 OPEN TLIF 6/2023   -Varicose Veins  - HLD  - HTN  - obesity  - depression  - h/o cigarette smoking  -Colon polyps - repeat colonoscopy 2026  -Stage 2 pressure ulcer with surrounding deep tissue injury     PLAN:  Available records and recent clinic notes reviewed.   - Completed colonoscopy, 5 poyps, repeat in 3 years, 2026.   -Referral to Wound Care for gluteal wound. Discussed weight off loading.   - Doing well Post -op from back surgery. Recently got back into daily physical activity.   -Advised pt to use compression stocking and follow up with vascular surgery     #Health maintenance: Cologuard screening repeat " 2026.  He received Pneumovax, Shingrix, COVID-19 vaccines. Advised yearly flu shot.     RTC 6 mo or PRN    Dr. Zac Humphrey   Internal Medicine PGY-2

## 2023-10-05 DIAGNOSIS — F32.A DEPRESSION, UNSPECIFIED: ICD-10-CM

## 2023-10-06 RX ORDER — VENLAFAXINE HYDROCHLORIDE 150 MG/1
150 CAPSULE, EXTENDED RELEASE ORAL DAILY
Qty: 90 CAPSULE | Refills: 0 | Status: SHIPPED | OUTPATIENT
Start: 2023-10-06 | End: 2024-03-26

## 2023-11-24 DIAGNOSIS — Z00.00 ENCOUNTER FOR GENERAL ADULT MEDICAL EXAMINATION WITHOUT ABNORMAL FINDINGS: ICD-10-CM

## 2023-11-27 RX ORDER — NEBIVOLOL 10 MG/1
TABLET ORAL
Qty: 90 TABLET | Refills: 0 | Status: SHIPPED | OUTPATIENT
Start: 2023-11-27 | End: 2024-02-12 | Stop reason: SDUPTHER

## 2023-12-10 DIAGNOSIS — Z00.00 ENCOUNTER FOR GENERAL ADULT MEDICAL EXAMINATION WITHOUT ABNORMAL FINDINGS: ICD-10-CM

## 2023-12-11 RX ORDER — RAMIPRIL 10 MG/1
CAPSULE ORAL
Qty: 180 CAPSULE | Refills: 0 | Status: SHIPPED | OUTPATIENT
Start: 2023-12-11 | End: 2024-03-11

## 2023-12-15 DIAGNOSIS — Z00.00 ENCOUNTER FOR GENERAL ADULT MEDICAL EXAMINATION WITHOUT ABNORMAL FINDINGS: ICD-10-CM

## 2023-12-15 RX ORDER — ATORVASTATIN CALCIUM 80 MG/1
TABLET, FILM COATED ORAL
Qty: 30 TABLET | Refills: 0 | Status: SHIPPED | OUTPATIENT
Start: 2023-12-15 | End: 2024-01-15

## 2023-12-21 ENCOUNTER — OFFICE VISIT (OUTPATIENT)
Dept: GENETICS | Facility: CLINIC | Age: 73
End: 2023-12-21
Payer: MEDICARE

## 2023-12-21 VITALS
RESPIRATION RATE: 22 BRPM | TEMPERATURE: 98.5 F | DIASTOLIC BLOOD PRESSURE: 96 MMHG | HEIGHT: 74 IN | HEART RATE: 86 BPM | BODY MASS INDEX: 29.2 KG/M2 | WEIGHT: 227.5 LBS | SYSTOLIC BLOOD PRESSURE: 158 MMHG

## 2023-12-21 DIAGNOSIS — M21.42 PES PLANUS OF BOTH FEET: ICD-10-CM

## 2023-12-21 DIAGNOSIS — I71.21 ANEURYSM OF ASCENDING AORTA WITHOUT RUPTURE (CMS-HCC): Primary | ICD-10-CM

## 2023-12-21 DIAGNOSIS — Q67.6 PECTUS EXCAVATUM: ICD-10-CM

## 2023-12-21 DIAGNOSIS — M21.41 PES PLANUS OF BOTH FEET: ICD-10-CM

## 2023-12-21 DIAGNOSIS — I71.40 ABDOMINAL AORTIC ANEURYSM (AAA), UNSPECIFIED PART, UNSPECIFIED WHETHER RUPTURED (CMS-HCC): ICD-10-CM

## 2023-12-21 PROCEDURE — 1159F MED LIST DOCD IN RCRD: CPT | Performed by: MEDICAL GENETICS

## 2023-12-21 PROCEDURE — 99205 OFFICE O/P NEW HI 60 MIN: CPT | Performed by: MEDICAL GENETICS

## 2023-12-21 PROCEDURE — 1036F TOBACCO NON-USER: CPT | Performed by: MEDICAL GENETICS

## 2023-12-21 PROCEDURE — 3077F SYST BP >= 140 MM HG: CPT | Performed by: MEDICAL GENETICS

## 2023-12-21 PROCEDURE — 3008F BODY MASS INDEX DOCD: CPT | Performed by: MEDICAL GENETICS

## 2023-12-21 PROCEDURE — 1126F AMNT PAIN NOTED NONE PRSNT: CPT | Performed by: MEDICAL GENETICS

## 2023-12-21 PROCEDURE — 3080F DIAST BP >= 90 MM HG: CPT | Performed by: MEDICAL GENETICS

## 2023-12-21 PROCEDURE — 1160F RVW MEDS BY RX/DR IN RCRD: CPT | Performed by: MEDICAL GENETICS

## 2023-12-21 ASSESSMENT — ENCOUNTER SYMPTOMS
LOSS OF SENSATION IN FEET: 0
OCCASIONAL FEELINGS OF UNSTEADINESS: 0
DEPRESSION: 0

## 2023-12-21 ASSESSMENT — PATIENT HEALTH QUESTIONNAIRE - PHQ9
1. LITTLE INTEREST OR PLEASURE IN DOING THINGS: NOT AT ALL
2. FEELING DOWN, DEPRESSED OR HOPELESS: NOT AT ALL
SUM OF ALL RESPONSES TO PHQ9 QUESTIONS 1 AND 2: 0

## 2023-12-21 NOTE — LETTER
01/22/24    Kevin Del Rio MD  [address]  [place] [address]      Dear Dr. Kevin Del Rio MD,    I am writing to confirm that your patient, Kevin Mike  received care in my office on 01/22/24. I have enclosed a summary of the care provided to Kevin for your reference.    Please contact me with any questions you may have regarding the visit.    Sincerely,         Jocy Mishra MD PhD  [id number]  [place] [id number]    CC: No Recipients

## 2024-01-02 ENCOUNTER — APPOINTMENT (OUTPATIENT)
Dept: PRIMARY CARE | Facility: CLINIC | Age: 74
End: 2024-01-02
Payer: MEDICARE

## 2024-01-05 PROBLEM — I48.19 PERSISTENT ATRIAL FIBRILLATION (MULTI): Chronic | Status: ACTIVE | Noted: 2023-04-04

## 2024-01-05 PROBLEM — E78.00 HYPERCHOLESTEROLEMIA: Chronic | Status: ACTIVE | Noted: 2023-04-04

## 2024-01-05 PROBLEM — I48.19 PERSISTENT ATRIAL FIBRILLATION (MULTI): Status: ACTIVE | Noted: 2023-04-04

## 2024-01-05 PROBLEM — E66.9 OBESITY: Status: ACTIVE | Noted: 2024-01-05

## 2024-01-05 PROBLEM — Z98.1 STATUS POST LUMBAR SPINAL FUSION: Status: ACTIVE | Noted: 2024-01-05

## 2024-01-05 PROBLEM — J44.9 CHRONIC OBSTRUCTIVE LUNG DISEASE (MULTI): Status: ACTIVE | Noted: 2019-12-19

## 2024-01-05 PROBLEM — R19.5 POSITIVE COLORECTAL CANCER SCREENING USING COLOGUARD TEST: Status: ACTIVE | Noted: 2024-01-05

## 2024-01-05 PROBLEM — F32.5 MAJOR DEPRESSIVE DISORDER WITH SINGLE EPISODE, IN FULL REMISSION (CMS-HCC): Status: ACTIVE | Noted: 2018-04-30

## 2024-01-05 PROBLEM — I71.40 AAA (ABDOMINAL AORTIC ANEURYSM) (CMS-HCC): Chronic | Status: ACTIVE | Noted: 2023-04-04

## 2024-01-05 PROBLEM — I25.10 CORONARY ARTERY DISEASE: Chronic | Status: ACTIVE | Noted: 2023-04-04

## 2024-01-11 ENCOUNTER — TELEPHONE (OUTPATIENT)
Dept: GENETICS | Facility: CLINIC | Age: 74
End: 2024-01-11
Payer: MEDICARE

## 2024-01-11 NOTE — TELEPHONE ENCOUNTER
Phone call to Kevin Mike with results of genetic testing.  He was initially seen in the Cardiogenetics Clinic on December 21, 2023 because of aortic aneurysm.  Following the appointment, a sample was sent for genetic testing.    RESULTS:  An Aortopathy Comprehensive panel was done.  This test evaluates the following genes:  ACTA2, JXDYAM16, ARIH1, BGN, CBS, COL3A1, COL5A1, COL5A2, EFEMP2, FBN1, FBN2, FLNA, FOXE3, HCN4, LOX, LTBP3, MAT2A, MED12, MFAP5, MYH11, MYLK, NOTCH1, PLOD1, PLOD3, PRKG1, SKI, ILN5B14, SMAD2, SMAD3, SMAD4, SMAD6, TGFB2, TGFB3, TGFBR1, and TGFBR2.    No disease causing mutations were found.    No variants of uncertain significance were detected.    Testing was performed by [employer] and consisted of next generation sequencing of the exons and flanking intronic regions of the above genes.    DISCUSSION:  Testing of 35 genes associated with aortic aneurysm did not detect any mutations.  A genetic cause for his aneurysms has not been identified.  He should continue follow up with his current providers.    Since no mutation was found for him, there is no genetic testing to offer his family members.  His first degree relatives (parents, siblings, children) are considered at increased risk for aneurysm based on the family history.  Cardiac evaluation is recommended for them to assess their aortic dimensions.    I asked that he contact the Genetics Clinic in 3-4 years to determine if new genetic testing has been developed that would be appropriate for him, based on his history of aortic aneurysm.

## 2024-01-15 PROBLEM — I21.19 ST ELEVATION (STEMI) MYOCARDIAL INFARCTION INVOLVING OTHER CORONARY ARTERY OF INFERIOR WALL (MULTI): Chronic | Status: ACTIVE | Noted: 2023-04-04

## 2024-01-15 PROBLEM — I71.20 THORACIC AORTIC ANEURYSM WITHOUT RUPTURE (CMS-HCC): Chronic | Status: ACTIVE | Noted: 2023-04-04

## 2024-01-15 PROBLEM — R06.02 SOB (SHORTNESS OF BREATH) ON EXERTION: Chronic | Status: ACTIVE | Noted: 2023-04-04

## 2024-01-15 PROBLEM — I10 BENIGN ESSENTIAL HTN: Chronic | Status: ACTIVE | Noted: 2023-04-04

## 2024-01-16 ENCOUNTER — OFFICE VISIT (OUTPATIENT)
Dept: CARDIOLOGY | Facility: CLINIC | Age: 74
End: 2024-01-16
Payer: MEDICARE

## 2024-01-16 VITALS
OXYGEN SATURATION: 97 % | DIASTOLIC BLOOD PRESSURE: 78 MMHG | HEIGHT: 74 IN | SYSTOLIC BLOOD PRESSURE: 118 MMHG | BODY MASS INDEX: 28.75 KG/M2 | HEART RATE: 86 BPM | WEIGHT: 224 LBS

## 2024-01-16 DIAGNOSIS — E78.00 HYPERCHOLESTEROLEMIA: Chronic | ICD-10-CM

## 2024-01-16 DIAGNOSIS — I10 BENIGN ESSENTIAL HTN: Chronic | ICD-10-CM

## 2024-01-16 DIAGNOSIS — I25.10 CORONARY ARTERY DISEASE INVOLVING NATIVE CORONARY ARTERY OF NATIVE HEART WITHOUT ANGINA PECTORIS: Chronic | ICD-10-CM

## 2024-01-16 DIAGNOSIS — I48.19 PERSISTENT ATRIAL FIBRILLATION (MULTI): Chronic | ICD-10-CM

## 2024-01-16 DIAGNOSIS — I71.21 ANEURYSM OF ASCENDING AORTA WITHOUT RUPTURE (CMS-HCC): Primary | Chronic | ICD-10-CM

## 2024-01-16 DIAGNOSIS — J43.9 PULMONARY EMPHYSEMA, UNSPECIFIED EMPHYSEMA TYPE (MULTI): ICD-10-CM

## 2024-01-16 PROCEDURE — 1126F AMNT PAIN NOTED NONE PRSNT: CPT | Performed by: INTERNAL MEDICINE

## 2024-01-16 PROCEDURE — 1160F RVW MEDS BY RX/DR IN RCRD: CPT | Performed by: INTERNAL MEDICINE

## 2024-01-16 PROCEDURE — 3074F SYST BP LT 130 MM HG: CPT | Performed by: INTERNAL MEDICINE

## 2024-01-16 PROCEDURE — 93000 ELECTROCARDIOGRAM COMPLETE: CPT | Performed by: INTERNAL MEDICINE

## 2024-01-16 PROCEDURE — 1036F TOBACCO NON-USER: CPT | Performed by: INTERNAL MEDICINE

## 2024-01-16 PROCEDURE — 99214 OFFICE O/P EST MOD 30 MIN: CPT | Performed by: INTERNAL MEDICINE

## 2024-01-16 PROCEDURE — 3078F DIAST BP <80 MM HG: CPT | Performed by: INTERNAL MEDICINE

## 2024-01-16 PROCEDURE — 1159F MED LIST DOCD IN RCRD: CPT | Performed by: INTERNAL MEDICINE

## 2024-01-16 PROCEDURE — 3008F BODY MASS INDEX DOCD: CPT | Performed by: INTERNAL MEDICINE

## 2024-01-16 NOTE — PROGRESS NOTES
JANNETTE Stanley is here for 6-month follow-up.  Feeling well.  No chest pain or pressure no shortness of breath no palpitations no episodes of atrial fibrillation.    Past Medical History:  Problem List Items Addressed This Visit    None       Past Medical History:   Diagnosis Date    AAA (abdominal aortic aneurysm) (CMS/[facility]) 04/04/2023    Repair 2010    Coronary artery disease 04/04/2023    CABG 1992 [place] x 3 vessels. Cath 2019 left main 99% stenosis, LAD occluded, circumflex 80% proximal 95% mid, RCA totally occluded, LIMA to the LAD patent, SVG to the mid circumflex chronically occluded, SVG to the right PDA chronically occluded. Status post LATOYA left main going into the circumflex with loss of the ramus    Hypercholesterolemia 04/04/2023    Dr. Chino    Persistent atrial fibrillation (CMS/[facility]) 04/04/2023 1999 , then again 2019 on Xarelto             Past Surgical History:  He has a past surgical history that includes Other surgical history (04/19/2022); Other surgical history (04/19/2022); Other surgical history (04/19/2022); Other surgical history (04/19/2022); Other surgical history (04/19/2022); and Other surgical history (09/13/2022).      Social History:  He reports that he has quit smoking. His smoking use included cigarettes. He has never used smokeless tobacco. He reports current alcohol use. He reports that he does not currently use drugs.    Family History:  Family History   Problem Relation Name Age of Onset    Other (cardiac disorder) Father      Heart attack Father  66        Allergies:  Patient has no known allergies.    Outpatient Medications:  Current Outpatient Medications   Medication Instructions    atorvastatin (Lipitor) 80 mg tablet TAKE 1 TABLET BY MOUTH EVERY DAY    ezetimibe (ZETIA) 10 mg, oral, Daily    lidocaine (Lidoderm) 5 % patch 1 patch, transdermal, Daily, Remove & discard patch within 12 hours or as directed by MD.    nebivolol (Bystolic) 10 mg tablet TAKE 1 TABLET BY MOUTH EVERY  DAY    ramipril (Altace) 10 mg capsule TAKE 1 CAPSULE BY MOUTH TWICE A DAY    venlafaxine XR (EFFEXOR-XR) 150 mg, oral, Daily, Do not crush or chew.        Last Recorded Vitals:  There were no vitals filed for this visit.    Physical Exam    Physical  Patient is alert and oriented x3.  HEENT is unremarkable mucous members are moist  Neck no JVP no bruits upstrokes are full no thyromegaly  Lungs are clear bilaterally.  No wheezing crackles or rales  Heart regular rhythm normal S1-S2 there is no S3 no murmurs are heard.  Abdomen is soft vessels are positive nontender nondistended no organomegaly no pulsatile masses  Extremities have no edema.  Distal pulses present palpable.  Neuro is grossly nonfocal  Skin has no rashes     Last Labs:  CBC -  Lab Results   Component Value Date    WBC CANCELED 06/16/2023    HGB CANCELED 06/16/2023    HCT CANCELED 06/16/2023    MCV CANCELED 06/16/2023    PLT CANCELED 06/16/2023       CMP -  Lab Results   Component Value Date    CALCIUM CANCELED 06/16/2023    PHOS CANCELED 06/16/2023    PROT 7.1 06/05/2023    ALBUMIN CANCELED 06/16/2023    AST 27 06/05/2023    ALT 22 06/05/2023    ALKPHOS 124 06/05/2023    BILITOT 0.9 06/05/2023       LIPID PANEL -   Lab Results   Component Value Date    CHOL 112 01/17/2023    HDL 53.5 01/17/2023    CHHDL 2.1 01/17/2023    VLDL 11 01/17/2023    TRIG 57 01/17/2023       RENAL FUNCTION PANEL -   Lab Results   Component Value Date    K CANCELED 06/16/2023    PHOS CANCELED 06/16/2023       Lab Results   Component Value Date    HGBA1C 5.7 (A) 06/05/2023     Procedure  CT Watchman [12/22/2022]: S/P LA occlusion (Watchman device deployment). Well seated LAAO device w/no ev/of sig contrast opacification w/in or distal to device. No ev/of cristiana-device leak. No ev/of LA thrombus. Three-vessel CAD, s/p PCI & CABG. Current study not optimized for assessment of coronary artery patency.     CT Watchman device [08/26/2022]: No STEVAN thrombus noted. S/p CABG. LIMA to LAD  appears patent in vis'd segment. Aorta to OM and RCA appear occluded. Severe native coronary artery calcifications. Severe aortic atherosclerosis. Dilated descending thoracic aorta @ 3.9 cm.     ECHO [08/26/2022]: EF 55-60%. Pulm artery not well vis'd.      LAAC [08/26/2022, Dr. Mando Brady]: Successful transcatheter left atrial appendage closure with 31 mm Watchman FLX device     EX NST [05/19/2022]: 5 min 1 sec (7 METs) . . . Abnormal = ev/for prior inferoposterior wall MI w/o residual cristiana-infarct ischemia. WMAs, EF 43%.     ECHO [05/13/2022]: Est EF 50%. SD = impaired relaxation pattern. Mild AR. Aorta dial @ 4.6 cm at Sinus of Valsalva and 4.1 cm in ascending aorta.      ECHO [04/01/2021]: EF 60%, akinesis basal inferior and inferoseptal wall,     CVN [12/23/2019, Dr. Mansoor Rutledge]: Successful CVN of A-fib to sinus rhythm (200J). Resume outpt cardiac medications. Can discontinue isosorbide. Followup as scheduled.     PCI [12/10/2019, Dr. Mansoor Rutledge]: 3 vessel CAD; VG to OM and VG to RPDA occluded; Successful PCI / DESx2 from mid circumflex to ostial left main. Cath 2019 left main 99% stenosis, LAD occluded, circumflex 80% proximal 95% mid, RCA totally occluded, LIMA to the LAD patent, SVG to the mid circumflex chronically occluded, SVG to the right PDA chronically occluded. Status post LATOYA left main going into the circumflex with loss of the ramus     AAA repair 2008     CABG 1995          Assessment/Plan   1. CAD. Bypass 1990s in [place]. 2 out of his 3 bypass grafts are known to be occluded. LIMA to the LAD is patent. 2019 myocardial infarction for which she underwent a complicated by prior PCI stenting left main into the circumflex. Ramus was lost at that time. Last stress test May 2022 he walked for 5 minutes. Inferoposterior scar no residual. For ischemia EF 43%.No symptoms of angina recently. Continue with atorvastatin sexual ramipril.     2. Hyperlipidemia. With 80 mg of atorvastatin LDL was still  too high at 99. After adding Zetia it came down to 47. Repeat labs to be drawn in January 2024.  This will be done when he sees Dr. Del Rio in the near future.     3. Paroxysmal atrial fibrillation. He states after 1999. Status post watchman implant for the [place] AF study. Not on Xarelto or Plavix or Eliquis. Status post ablation for A-fib in April 2023 with Dr. Markham. 1 no recurrent episodes of atrial fibrillation has been noted     4 cardiomyopathy. Ejection fraction 43% on the stress test 50% on his echo. Continue with ramipril and Bystolic.  No volume overload.    5.TAA 4.1 cm in the ascending aorta 4.6 cm sinuses Valsalva. Repeat echo sometime 2024.  This to be done July 2024.  He is seeing Dr. Cruz.  A CT scan is scheduled in July.  I am hoping to see correlation between the echo and CT scan of the echo that can be utilized to see if radiation exposure     6 shortness of breath improved since he had the ablation.     7. Hypertension. Well-controlled    EKG today.  Echo in July.  Return to see me 1 year.  He will call us 1 week after the echo to review the results.       Clementine Bhatti MD     Instructions and follow up

## 2024-01-16 NOTE — PATIENT INSTRUCTIONS
1. CAD. Bypass 1990s in [place]. 2 out of his 3 bypass grafts are known to be occluded. LIMA to the LAD is patent. 2019 myocardial infarction for which she underwent a complicated by prior PCI stenting left main into the circumflex. Ramus was lost at that time. Last stress test May 2022 he walked for 5 minutes. Inferoposterior scar no residual. For ischemia EF 43%.No symptoms of angina recently. Continue with atorvastatin sexual ramipril.     2. Hyperlipidemia. With 80 mg of atorvastatin LDL was still too high at 99. After adding Zetia it came down to 47. Repeat labs to be drawn in January 2024.  This will be done when he sees Dr. Del Rio in the near future.     3. Paroxysmal atrial fibrillation. He states after 1999. Status post watchman implant for the [place] AF study. Not on Xarelto or Plavix or Eliquis. Status post ablation for A-fib in April 2023 with Dr. Markham. 1 no recurrent episodes of atrial fibrillation has been noted     4 cardiomyopathy. Ejection fraction 43% on the stress test 50% on his echo. Continue with ramipril and Bystolic.  No volume overload.    5.TAA 4.1 cm in the ascending aorta 4.6 cm sinuses Valsalva. Repeat echo sometime 2024.  This to be done July 2024.  He is seeing Dr. Cruz.  A CT scan is scheduled in July.  I am hoping to see correlation between the echo and CT scan of the echo that can be utilized to see if radiation exposure     6 shortness of breath improved since he had the ablation.     7. Hypertension. Well-controlled    EKG today.  Echo in July.  Return to see me 1 year.  He will call us 1 week after the echo to review the results.

## 2024-01-23 NOTE — PROGRESS NOTES
Kevin Mike is a 73 year old referred for evaluation for a connective tissue disorder because of aortic aneurysms, as well as a family history of aortic aneurysms.  History is obtained from him and from the electronic medical record.    Mr Mike notes that his cardiac issues began at age 44 when he required triple bypass surgery.  He notes he has had a total of 4 stents placed for atherosclerotic disease.  Approximately 15 years ago, he had a stent placed for his abdominal aortic aneurysm.  This resulted in decreased circulation to his legs and had to be re-done.  More recently he was found to have a thoracic aortic aneurysm.      He is not troubled by shortness of breath, dizziness, or palpitations.  He had an ablation for a-fib in 2022.  He had undergone 2 cardioversions prior to that.      He does not think his joints are excessively flexible.  He reports pain in multiple joints.  He has not had any joint dislocations or fractures.  He does not have limitations to his activities from an orthopedic standpoint.    He first started wearing corrective lenses for myopia in elementary school.  His last visit to the eye doctor was in April 2023 and it was a dilated exam.  He has never been told that he has lens dislocation.  He was noted to have early stage cataracts.    He has not required stitches for injury. He feels his wounds heal in a normal amount of time and the resulting scars are normal.      He feels that he bruises easily, but does not have prolonged bleeding.    He did not require the extraction of adult teeth for crowding.  He did not require orthodontic braces or a palate expander.    Birth/ developmental hx: Born at term after uncomplicated pregnancy.  Birth weight was 10 pounds.  He went home from the hospital with his mother at the usual time.  Developmental milestones were met on time and he did not require St, OT, or PT.    FAMILY HISTORY:  A five generation pedigree was obtained.  Pertinent findings  "include:  - Older brother, 5'8\" tall, with abdominal aortic aneurysm  - Older brother, 5'10\" tall, who required cardiac stents  - Father, 5'11\" tall, with an aortic aneurysm  - Paternal grandmother  in her 40's, cause unknown  - Son with an arrhythmia    PHYSICAL EXAM:  height: 188 cm (90-95 %ile)  weight: 103 kg (>97 %ile)  OFC: 61.0 cm (>95 %ile)  arm span: 185.7 cm  arm span:height ratio:  0.99  upper segment: 93 cm  lower segment:  95 cm  upper:lower segment ratio:  0.98   hand length: 20.2 cm (>97 %ile)  middle finger length:  8.5 cm (97 %ile)  middle finger as % of hand: 42% (25-50 %ile)  foot length:29.1  cm (>97 %ile)    General Appearance: healthy-appearing, well-nourished, in no acute distress. Communication: normal for age  Head Shape and Size: facial appearance is normal and non-dysmorphic.  Ears: normal shape and placement  Nose normally formed  Mouth: hard palate was intact and normally configured, not high-arched  Eyes: Exam of External Eye, Conjunctiva and Lids: normal; Examination of Pupils and Irises: normal; extra ocular movements were intact; no iridodonesis  Chest: pectus excavatum  Back: no scoliosis  Pulmonary: no respiratory distress and clear bilateral breath sounds  Cardiovascular: heart rate normal, normal S1 and S2, no gallops, no murmurs and no pericardial rub, rhythm normal.  Upper Extremity: no deformity, normal ROM, normal digits; negative wrist and thumb signs; no arachnodactyly  Lower Extremity: no deformity, erythema, ecchymosis, edema, or tenderness; pes planus bilaterally  Scalp and Hair: the scalp was normal and hair is normal for age.  Skin: normal texture and extensibility; scars are well-healed  Neurologic: normal muscle tone throughout; gait is intact.    RESULTS:  Echocardiogram, May 13. 2022  CONCLUSIONS:  1. The left ventricular systolic function is low normal with a 50% estimated ejection fraction.  2. Spectral Doppler shows an impaired relaxation pattern of left " ventricular diastolic filling.  3. There is mild aortic valve regurgitation.  4. The aorta is dilated measuring 4.6cm at the Sinus of Valsalva and 4.1 cm in the ascending aorta.    DISCUSSION:  Kevin Mike is a 73 year old with both thoracic and abdominal aortic aneurysms, pes planus, pectus excavatum, and a family history of aortic aneurysm.  He does not meet clinical criteria for Marfan syndrome.    Marfan syndrome (MFS) is a connective tissue disorder that affects the skeletal, ocular and cardiovascular systems, as well as the skin, lungs, and dura. Criteria exist for evaluating individuals and if enough features are present, it is possible to make a clinical diagnosis.  Although he has skeletal features that can be seen in connective tissue disorders, he does not have enough findings for the systemic score for Marfan syndrome.  He does not meet criteria for another specific condition either.  He has cardiac involvement.    We discussed the option of genetic testing.  Multiple genes have been identified for aortic dilatation/ aneurysm. Some of the genes are associated with specific connective tissue syndromes.  Recently it has been recognized that there are people who have mutations in these genes and have only the aortic dilatation without the other syndromic features.  It can be difficult to differentiate one genetic etiology from another by physical exam and/ or imaging studies.  The most efficient method for testing is to evaluate a panel of genes related to aortic dilatation at one time.  This testing is recommended for Mr. Mike.      Identification of a mutation in a specific gene will provide information about how to monitor his aorta and which medications can be used for slowing aortic enlargement.   If he is found to have a mutation in the FBN1 gene, treatment with losartan to halt further aortic dilatation will be recommended.  The timing of any prophylactic aortic surgery will also be determined by  the genetic etiology of his vascular issues.  If he has a mutation in one of the genes involved in TGFB signalling, he will require prophylactic aortic root replacement surgery at a smaller diameter due to the aggressive nature of aortic dilatation in these patients.    The consent form was reviewed and signed. Potential results of genetic testing were discussed, including positive (an alteration is identified that is known to predispose to aortopathy); negative (no alterations are detected in any of the genes); variant of uncertain significance (an alteration is found in a gene, but it is not known if that change will result in disease).  A buccal swab sample was collected today.  Authorization will be obtained from the insurance company.  Results are usually available in 4 weeks from the time the sample is received in the lab.  He will be contacted once results are completed to arrange appropriate follow up.    time:  prep: 8 minutes  face to face: 50 minutes  other activities related to patient care: 12 minutes  documentation: 15 minutes  total: 85 minutes

## 2024-01-31 ENCOUNTER — TELEMEDICINE (OUTPATIENT)
Dept: PRIMARY CARE | Facility: CLINIC | Age: 74
End: 2024-01-31
Payer: MEDICARE

## 2024-01-31 DIAGNOSIS — U07.1 COVID: Primary | ICD-10-CM

## 2024-01-31 PROCEDURE — 99213 OFFICE O/P EST LOW 20 MIN: CPT | Performed by: STUDENT IN AN ORGANIZED HEALTH CARE EDUCATION/TRAINING PROGRAM

## 2024-01-31 NOTE — PROGRESS NOTES
Subjective   Patient ID: Kevin Mike is a 73 y.o. male who presents for covid positive (Tested positive for Covid today ).  HPI  Kevin presents for virtual sick visit.    Symptoms started yesterday morning. He took a COVID test this morning and it was positive. Complains of sore throat, rhinorrhea, nasal congestion, post nasal drip, intermittently productive cough. Some subjective fevers. Reports fatigue. Denies chills, body aches, CP, SOB, NVD, abdominal pain. He took a benadryl at home today for his symptoms. No other OTC treatment.    Review of Systems  12-point ROS was reviewed and is negative, unless otherwise noted in HPI    Objective   There were no vitals filed for this visit.   Physical Exam  GEN: alert, conversant, NAD    Limited due to virtual visit    Assessment/Plan   #COVID+  #URI with cough and congestion 2/2 above  - Counseled on isolation and masking protocols based on CDC recommendations  - Given script for Paxlovid, reviewed current medications, he will hold his atorvastatin for 1 week. Reviewed recent panel, no dose adjusment needed  - advised to stay well hydrated, resting regularly, continue supportive care measures  - Advised to call for worsening symptoms in the next 4-6 days, for any fevers/chills, or significant SOB/sputum production     I spent 27 minutes reviewing chart, visiting with patient, writing prescriptions and documenting in the chart.    RTC as scheduled, sooner PRN     Tylor Yañez DO

## 2024-02-12 ENCOUNTER — OFFICE VISIT (OUTPATIENT)
Dept: PRIMARY CARE | Facility: CLINIC | Age: 74
End: 2024-02-12
Payer: MEDICARE

## 2024-02-12 VITALS
BODY MASS INDEX: 28.49 KG/M2 | WEIGHT: 222 LBS | SYSTOLIC BLOOD PRESSURE: 122 MMHG | DIASTOLIC BLOOD PRESSURE: 78 MMHG | HEIGHT: 74 IN

## 2024-02-12 DIAGNOSIS — Z00.00 HEALTHCARE MAINTENANCE: ICD-10-CM

## 2024-02-12 DIAGNOSIS — Z00.00 ENCOUNTER FOR GENERAL ADULT MEDICAL EXAMINATION WITHOUT ABNORMAL FINDINGS: ICD-10-CM

## 2024-02-12 DIAGNOSIS — E78.00 HYPERCHOLESTEROLEMIA: ICD-10-CM

## 2024-02-12 DIAGNOSIS — Z00.00 HEALTH CARE MAINTENANCE: ICD-10-CM

## 2024-02-12 DIAGNOSIS — R25.2 CRAMPING OF HANDS: ICD-10-CM

## 2024-02-12 DIAGNOSIS — R73.03 PRE-DIABETES: ICD-10-CM

## 2024-02-12 DIAGNOSIS — I10 BENIGN ESSENTIAL HTN: Primary | ICD-10-CM

## 2024-02-12 PROCEDURE — 1160F RVW MEDS BY RX/DR IN RCRD: CPT | Performed by: STUDENT IN AN ORGANIZED HEALTH CARE EDUCATION/TRAINING PROGRAM

## 2024-02-12 PROCEDURE — 1159F MED LIST DOCD IN RCRD: CPT | Performed by: STUDENT IN AN ORGANIZED HEALTH CARE EDUCATION/TRAINING PROGRAM

## 2024-02-12 PROCEDURE — 99214 OFFICE O/P EST MOD 30 MIN: CPT | Performed by: STUDENT IN AN ORGANIZED HEALTH CARE EDUCATION/TRAINING PROGRAM

## 2024-02-12 PROCEDURE — 3078F DIAST BP <80 MM HG: CPT | Performed by: STUDENT IN AN ORGANIZED HEALTH CARE EDUCATION/TRAINING PROGRAM

## 2024-02-12 PROCEDURE — 1126F AMNT PAIN NOTED NONE PRSNT: CPT | Performed by: STUDENT IN AN ORGANIZED HEALTH CARE EDUCATION/TRAINING PROGRAM

## 2024-02-12 PROCEDURE — 1157F ADVNC CARE PLAN IN RCRD: CPT | Performed by: STUDENT IN AN ORGANIZED HEALTH CARE EDUCATION/TRAINING PROGRAM

## 2024-02-12 PROCEDURE — 3008F BODY MASS INDEX DOCD: CPT | Performed by: STUDENT IN AN ORGANIZED HEALTH CARE EDUCATION/TRAINING PROGRAM

## 2024-02-12 PROCEDURE — 3074F SYST BP LT 130 MM HG: CPT | Performed by: STUDENT IN AN ORGANIZED HEALTH CARE EDUCATION/TRAINING PROGRAM

## 2024-02-12 PROCEDURE — 1036F TOBACCO NON-USER: CPT | Performed by: STUDENT IN AN ORGANIZED HEALTH CARE EDUCATION/TRAINING PROGRAM

## 2024-02-12 RX ORDER — NEBIVOLOL 10 MG/1
10 TABLET ORAL DAILY
Qty: 90 TABLET | Refills: 1 | Status: SHIPPED | OUTPATIENT
Start: 2024-02-12

## 2024-02-12 NOTE — PROGRESS NOTES
"Subjective   Patient ID: Kevin Mike is a 73 y.o. male who presents for Follow-up (Bilateral hand cramping, x 1 month).    HPI   Routine fu.  He recently had COVID infection, feels that he is recovered.    He complains of cramping in his hands b/l that occurs about once daily, lasts for a few seconds, then goes away.  Review of Systems  12-point ROS reviewed and was negative unless otherwise noted in HPI.    Objective   Ht 1.88 m (6' 2\")   Wt 101 kg (222 lb)   BMI 28.50 kg/m²     Physical Exam  GEN: conversant, NAD  HEENT: PERRL, EOMI, MMM  NECK: supple, no carotid bruits appreciated b/l  CV: S1, S2, RRR  PULM: CTAB  ABD: soft, NT, ND  NEURO: no new gross focal deficits  EXT: no sig LE edema  PSYCH: appropriate affect    Assessment/Plan     - CAD s/p CABG in 1990s  - AAA s/p repair 2008  - Afib s/p Watchman 2022  - Thoracic aortic aneurysm being monitored by cardiology and thoracic surgery  -S/P   L4-L5 OPEN TLIF 6/2023   - Varicose Veins  - HLD  - HTN  - pre-diabetes  - overweight  - depression  - h/o cigarette smoking  - Colon polyps - repeat colonoscopy 2026     PLAN:  BP stable.  Unsure why he is having hand cramping, consider changing atorvastatin to rosuvastatin.   - Completed colonoscopy, 5 poyps, repeat in 3 years, 2026.   -Advised pt to use compression stocking and follow up with vascular surgery     #Health maintenance: colon cancer screening repeat due 2026.  He received Prevnar 20, Shingrix, COVID-19, RSV vaccines. Advised yearly flu shot.     RTC 6 mo      "

## 2024-02-20 ENCOUNTER — LAB (OUTPATIENT)
Dept: LAB | Facility: LAB | Age: 74
End: 2024-02-20
Payer: MEDICARE

## 2024-02-20 DIAGNOSIS — Z00.00 HEALTHCARE MAINTENANCE: ICD-10-CM

## 2024-02-20 DIAGNOSIS — Z00.00 HEALTH CARE MAINTENANCE: ICD-10-CM

## 2024-02-20 LAB
ALBUMIN SERPL BCP-MCNC: 4.4 G/DL (ref 3.4–5)
ALP SERPL-CCNC: 83 U/L (ref 33–136)
ALT SERPL W P-5'-P-CCNC: 35 U/L (ref 10–52)
ANION GAP SERPL CALC-SCNC: 16 MMOL/L (ref 10–20)
AST SERPL W P-5'-P-CCNC: 32 U/L (ref 9–39)
BILIRUB SERPL-MCNC: 0.8 MG/DL (ref 0–1.2)
BUN SERPL-MCNC: 14 MG/DL (ref 6–23)
CALCIUM SERPL-MCNC: 9.4 MG/DL (ref 8.6–10.3)
CHLORIDE SERPL-SCNC: 96 MMOL/L (ref 98–107)
CHOLEST SERPL-MCNC: 165 MG/DL (ref 0–199)
CHOLESTEROL/HDL RATIO: 3
CO2 SERPL-SCNC: 25 MMOL/L (ref 21–32)
CREAT SERPL-MCNC: 0.8 MG/DL (ref 0.5–1.3)
EGFRCR SERPLBLD CKD-EPI 2021: >90 ML/MIN/1.73M*2
ERYTHROCYTE [DISTWIDTH] IN BLOOD BY AUTOMATED COUNT: 13 % (ref 11.5–14.5)
EST. AVERAGE GLUCOSE BLD GHB EST-MCNC: 114 MG/DL
GLUCOSE SERPL-MCNC: 83 MG/DL (ref 74–99)
HBA1C MFR BLD: 5.6 %
HCT VFR BLD AUTO: 43.7 % (ref 41–52)
HDLC SERPL-MCNC: 55.7 MG/DL
HGB BLD-MCNC: 15.2 G/DL (ref 13.5–17.5)
LDLC SERPL CALC-MCNC: 80 MG/DL
MCH RBC QN AUTO: 32.9 PG (ref 26–34)
MCHC RBC AUTO-ENTMCNC: 34.8 G/DL (ref 32–36)
MCV RBC AUTO: 95 FL (ref 80–100)
NON HDL CHOLESTEROL: 109 MG/DL (ref 0–149)
NRBC BLD-RTO: 0 /100 WBCS (ref 0–0)
PLATELET # BLD AUTO: 144 X10*3/UL (ref 150–450)
POTASSIUM SERPL-SCNC: 4.6 MMOL/L (ref 3.5–5.3)
PROT SERPL-MCNC: 7.3 G/DL (ref 6.4–8.2)
RBC # BLD AUTO: 4.62 X10*6/UL (ref 4.5–5.9)
SODIUM SERPL-SCNC: 132 MMOL/L (ref 136–145)
TRIGL SERPL-MCNC: 147 MG/DL (ref 0–149)
TSH SERPL-ACNC: 1.31 MIU/L (ref 0.44–3.98)
VLDL: 29 MG/DL (ref 0–40)
WBC # BLD AUTO: 7.5 X10*3/UL (ref 4.4–11.3)

## 2024-02-20 PROCEDURE — 80053 COMPREHEN METABOLIC PANEL: CPT

## 2024-02-20 PROCEDURE — 84443 ASSAY THYROID STIM HORMONE: CPT

## 2024-02-20 PROCEDURE — 36415 COLL VENOUS BLD VENIPUNCTURE: CPT

## 2024-02-20 PROCEDURE — 85027 COMPLETE CBC AUTOMATED: CPT

## 2024-02-20 PROCEDURE — 83036 HEMOGLOBIN GLYCOSYLATED A1C: CPT

## 2024-02-20 PROCEDURE — 80061 LIPID PANEL: CPT

## 2024-03-10 DIAGNOSIS — Z00.00 ENCOUNTER FOR GENERAL ADULT MEDICAL EXAMINATION WITHOUT ABNORMAL FINDINGS: ICD-10-CM

## 2024-03-11 RX ORDER — RAMIPRIL 10 MG/1
CAPSULE ORAL
Qty: 180 CAPSULE | Refills: 0 | Status: SHIPPED | OUTPATIENT
Start: 2024-03-11 | End: 2024-06-11

## 2024-03-26 DIAGNOSIS — F32.A DEPRESSION, UNSPECIFIED: ICD-10-CM

## 2024-03-26 RX ORDER — VENLAFAXINE HYDROCHLORIDE 150 MG/1
150 CAPSULE, EXTENDED RELEASE ORAL DAILY
Qty: 90 CAPSULE | Refills: 0 | Status: SHIPPED | OUTPATIENT
Start: 2024-03-26

## 2024-04-14 DIAGNOSIS — Z00.00 ENCOUNTER FOR GENERAL ADULT MEDICAL EXAMINATION WITHOUT ABNORMAL FINDINGS: ICD-10-CM

## 2024-04-15 RX ORDER — ATORVASTATIN CALCIUM 80 MG/1
TABLET, FILM COATED ORAL
Qty: 90 TABLET | Refills: 0 | Status: SHIPPED | OUTPATIENT
Start: 2024-04-15

## 2024-06-11 DIAGNOSIS — Z00.00 ENCOUNTER FOR GENERAL ADULT MEDICAL EXAMINATION WITHOUT ABNORMAL FINDINGS: ICD-10-CM

## 2024-06-11 RX ORDER — RAMIPRIL 10 MG/1
CAPSULE ORAL
Qty: 180 CAPSULE | Refills: 0 | Status: SHIPPED | OUTPATIENT
Start: 2024-06-11

## 2024-06-24 DIAGNOSIS — F32.A DEPRESSION, UNSPECIFIED: ICD-10-CM

## 2024-06-24 RX ORDER — VENLAFAXINE HYDROCHLORIDE 150 MG/1
150 CAPSULE, EXTENDED RELEASE ORAL DAILY
Qty: 90 CAPSULE | Refills: 0 | Status: SHIPPED | OUTPATIENT
Start: 2024-06-24

## 2024-07-09 ENCOUNTER — HOSPITAL ENCOUNTER (OUTPATIENT)
Dept: RADIOLOGY | Facility: HOSPITAL | Age: 74
Discharge: HOME | End: 2024-07-09
Payer: MEDICARE

## 2024-07-09 ENCOUNTER — HOSPITAL ENCOUNTER (OUTPATIENT)
Dept: CARDIOLOGY | Facility: CLINIC | Age: 74
Discharge: HOME | End: 2024-07-09
Payer: MEDICARE

## 2024-07-09 ENCOUNTER — APPOINTMENT (OUTPATIENT)
Dept: RADIOLOGY | Facility: CLINIC | Age: 74
End: 2024-07-09
Payer: MEDICARE

## 2024-07-09 DIAGNOSIS — I71.21 ANEURYSM OF ASCENDING AORTA WITHOUT RUPTURE (CMS-HCC): Chronic | ICD-10-CM

## 2024-07-09 DIAGNOSIS — Z95.818 PRESENCE OF OTHER CARDIAC IMPLANTS AND GRAFTS: ICD-10-CM

## 2024-07-09 DIAGNOSIS — I25.10 CORONARY ARTERY DISEASE INVOLVING NATIVE CORONARY ARTERY OF NATIVE HEART WITHOUT ANGINA PECTORIS: Chronic | ICD-10-CM

## 2024-07-09 DIAGNOSIS — I71.20 THORACIC AORTIC ANEURYSM, WITHOUT RUPTURE, UNSPECIFIED (CMS-HCC): ICD-10-CM

## 2024-07-09 LAB
CREAT SERPL-MCNC: 1 MG/DL (ref 0.6–1.3)
GFR SERPL CREATININE-BSD FRML MDRD: 80 ML/MIN/1.73M*2

## 2024-07-09 PROCEDURE — 2550000001 HC RX 255 CONTRASTS: Performed by: INTERNAL MEDICINE

## 2024-07-09 PROCEDURE — 93306 TTE W/DOPPLER COMPLETE: CPT

## 2024-07-09 PROCEDURE — 71275 CT ANGIOGRAPHY CHEST: CPT

## 2024-07-09 PROCEDURE — 82565 ASSAY OF CREATININE: CPT

## 2024-07-10 LAB
AORTIC VALVE MEAN GRADIENT: 1.7 MMHG
AORTIC VALVE PEAK VELOCITY: 1 M/S
AV PEAK GRADIENT: 4 MMHG
AVA (PEAK VEL): 3.82 CM2
AVA (VTI): 3.73 CM2
EJECTION FRACTION APICAL 4 CHAMBER: 42.7
EJECTION FRACTION: 50 %
LEFT VENTRICLE INTERNAL DIMENSION DIASTOLE: 5.18 CM (ref 3.5–6)
LEFT VENTRICULAR OUTFLOW TRACT DIAMETER: 2.54 CM
RIGHT VENTRICLE FREE WALL PEAK S': 0.08 CM/S
TRICUSPID ANNULAR PLANE SYSTOLIC EXCURSION: 2 CM

## 2024-07-11 ENCOUNTER — APPOINTMENT (OUTPATIENT)
Dept: CARDIOLOGY | Facility: CLINIC | Age: 74
End: 2024-07-11
Payer: MEDICARE

## 2024-07-15 ENCOUNTER — APPOINTMENT (OUTPATIENT)
Dept: CARDIAC SURGERY | Facility: CLINIC | Age: 74
End: 2024-07-15
Payer: MEDICARE

## 2024-07-16 ENCOUNTER — TELEMEDICINE (OUTPATIENT)
Dept: CARDIAC SURGERY | Facility: CLINIC | Age: 74
End: 2024-07-16
Payer: MEDICARE

## 2024-07-16 DIAGNOSIS — I71.21 ANEURYSM OF ASCENDING AORTA WITHOUT RUPTURE (CMS-HCC): Primary | Chronic | ICD-10-CM

## 2024-07-16 SDOH — ECONOMIC STABILITY: FOOD INSECURITY: WITHIN THE PAST 12 MONTHS, YOU WORRIED THAT YOUR FOOD WOULD RUN OUT BEFORE YOU GOT MONEY TO BUY MORE.: NEVER TRUE

## 2024-07-16 SDOH — ECONOMIC STABILITY: FOOD INSECURITY: WITHIN THE PAST 12 MONTHS, THE FOOD YOU BOUGHT JUST DIDN'T LAST AND YOU DIDN'T HAVE MONEY TO GET MORE.: NEVER TRUE

## 2024-07-16 ASSESSMENT — ENCOUNTER SYMPTOMS
WEIGHT LOSS: 0
LOSS OF SENSATION IN FEET: 0
DEPRESSION: 0
DYSPNEA ON EXERTION: 0
COUGH: 0
OCCASIONAL FEELINGS OF UNSTEADINESS: 0
SLEEP DISTURBANCES DUE TO BREATHING: 0
PALPITATIONS: 0
FEVER: 0
WHEEZING: 0
WEIGHT GAIN: 0
DECREASED APPETITE: 0
CHILLS: 0

## 2024-07-16 ASSESSMENT — COLUMBIA-SUICIDE SEVERITY RATING SCALE - C-SSRS
6. HAVE YOU EVER DONE ANYTHING, STARTED TO DO ANYTHING, OR PREPARED TO DO ANYTHING TO END YOUR LIFE?: NO
2. HAVE YOU ACTUALLY HAD ANY THOUGHTS OF KILLING YOURSELF?: NO
1. IN THE PAST MONTH, HAVE YOU WISHED YOU WERE DEAD OR WISHED YOU COULD GO TO SLEEP AND NOT WAKE UP?: NO

## 2024-07-16 ASSESSMENT — PAIN SCALES - GENERAL: PAINLEVEL: 0-NO PAIN

## 2024-07-16 NOTE — PROGRESS NOTES
Subjective   Kevin Mike is a 73 y.o. male.    Chief Complaint:  Follow-up (Kevin has a follow up visit with a CT scan prior.)    A telephone visit (audio and visual only) between the patient (at the originating site) and the provider (at the distant site) was utilized to provide this telehealth service. ~ Verbal consent was requested and obtained from Mr. Mike on this date, 07/16/2024 11:00 AM , for a telehealth visit.      HPI  Mr. Lozano is a 73-year-old male who is being seen for his ascending aortic aneurysm. He presents today via virtual video for his aortic follow-up.  He reports that he is doing well overall and does not have any cardiac complaints.  Reports that he currently walks at the gym 3-4 times a week around the house.  Denies chest pain, shortness of breath, lower extremity edema, back pain, dizziness, and syncopal episodes.  He had a recent CT scan of the chest that will be reviewed and discussed.    Review of Systems   Constitutional: Negative for chills, decreased appetite, fever, weight gain and weight loss.   Cardiovascular:  Negative for dyspnea on exertion, leg swelling and palpitations.   Respiratory:  Negative for cough, sleep disturbances due to breathing and wheezing.        Objective   Psychiatric:         Attention and Perception: Attention normal.         Mood and Affect: Mood normal.         Speech: Speech normal.         Behavior: Behavior normal.         Thought Content: Thought content normal.         Cognition and Memory: Cognition normal.         Judgment: Judgment normal.         CT/Results:  CT scan done July 9, 2024 shows a stable dilation of the aortic root measuring at approximately 4.2 cm.  There is a aneurysmal dilation of the descending thoracic aorta that also measures at 4.2 cm.  There is also a considerable atherosclerotic disease of the thoracic aorta with calcified and noncalcified plaque, that is more severe in the descending aorta.    Assessment/Plan   In summary,  Mr. Mike presents for aortic follow-up.  He is doing well overall.  His dilation of the aortic root is stable.  We discussed the importance of monitoring his blood pressure at home as he states it sometimes runs 140/90 mmHg I asked that he notify his physician in order to get his BP more controlled.  Due to the fact that his dilation of the aorta is stable we will follow-up in 1 more year with a new CT scan scan.    Time: 40 minutes

## 2024-07-23 ENCOUNTER — APPOINTMENT (OUTPATIENT)
Dept: CARDIAC SURGERY | Facility: CLINIC | Age: 74
End: 2024-07-23
Payer: MEDICARE

## 2024-08-12 ENCOUNTER — APPOINTMENT (OUTPATIENT)
Dept: PRIMARY CARE | Facility: CLINIC | Age: 74
End: 2024-08-12
Payer: MEDICARE

## 2024-08-12 ASSESSMENT — ENCOUNTER SYMPTOMS: RHINORRHEA: 1

## 2024-08-13 ENCOUNTER — APPOINTMENT (OUTPATIENT)
Dept: PRIMARY CARE | Facility: CLINIC | Age: 74
End: 2024-08-13
Payer: MEDICARE

## 2024-08-13 VITALS — SYSTOLIC BLOOD PRESSURE: 130 MMHG | DIASTOLIC BLOOD PRESSURE: 82 MMHG

## 2024-08-13 DIAGNOSIS — E78.00 HYPERCHOLESTEROLEMIA: ICD-10-CM

## 2024-08-13 DIAGNOSIS — R35.0 BENIGN PROSTATIC HYPERPLASIA WITH URINARY FREQUENCY: ICD-10-CM

## 2024-08-13 DIAGNOSIS — F32.A DEPRESSION, UNSPECIFIED DEPRESSION TYPE: ICD-10-CM

## 2024-08-13 DIAGNOSIS — R21 RASH: Primary | ICD-10-CM

## 2024-08-13 DIAGNOSIS — I10 BENIGN ESSENTIAL HTN: ICD-10-CM

## 2024-08-13 DIAGNOSIS — R73.03 PRE-DIABETES: ICD-10-CM

## 2024-08-13 DIAGNOSIS — N40.1 BENIGN PROSTATIC HYPERPLASIA WITH URINARY FREQUENCY: ICD-10-CM

## 2024-08-13 RX ORDER — TAMSULOSIN HYDROCHLORIDE 0.4 MG/1
0.4 CAPSULE ORAL DAILY
Qty: 100 CAPSULE | Refills: 3 | Status: SHIPPED | OUTPATIENT
Start: 2024-08-13 | End: 2025-09-17

## 2024-08-13 ASSESSMENT — ENCOUNTER SYMPTOMS: RHINORRHEA: 1

## 2024-08-13 NOTE — PROGRESS NOTES
Subjective   Patient ID: Kevin Mike is a 73 y.o. male who presents for Follow-up and RASH ON FEET.    Rash  The affected locations include the left foot and right foot. He was exposed to nothing. Associated symptoms include rhinorrhea.      Routine fu.  He had cataract surgery since our last visit, happy with the results.    He feels generally well.    He has an intermittent rash on his feet, wants to see podiatry.  Review of Systems   HENT:  Positive for rhinorrhea.    Skin:  Positive for rash.     12-point ROS reviewed and was negative unless otherwise noted in HPI.    Objective   /82     Physical Exam  GEN: conversant, NAD  HEENT: PERRL, EOMI, MMM  NECK: supple, no carotid bruits appreciated b/l  CV: S1, S2, regular rate. Irregularly irregular rhythm  PULM: CTAB  ABD: soft, NT, ND  NEURO: no new gross focal deficits  EXT: no sig LE edema  PSYCH: appropriate affect    Assessment/Plan     - CAD s/p CABG in 1990s  - AAA s/p repair 2008  - Afib s/p Watchman 2022  - Thoracic aortic aneurysm being monitored by cardiology and thoracic surgery  - S/P  L4-L5 OPEN TLIF 6/2023   - Varicose Veins  - HLD  - HTN  - pre-diabetes  - overweight  - depression  - h/o cigarette smoking  - Colon polyps - repeat colonoscopy 2026     PLAN:  BP stable.    Referral to podiatry for foot rash.    Advised pt to use compression stocking and follow up with vascular surgery     #Health maintenance: colon cancer screening repeat due 2026.  He received Prevnar 20, Shingrix, COVID-19, RSV vaccines. Advised yearly flu shot. Longitudinal care.     RTC 6 mo          Answers submitted by the patient for this visit:  Rash Questionnaire (Submitted on 8/12/2024)  Chief Complaint: Rash  Affected locations: left foot, right foot  Exposed to: nothing  rhinorrhea: Yes

## 2024-08-14 DIAGNOSIS — I71.20 THORACIC AORTIC ANEURYSM, WITHOUT RUPTURE, UNSPECIFIED (CMS-HCC): ICD-10-CM

## 2024-08-14 DIAGNOSIS — Z00.00 ENCOUNTER FOR GENERAL ADULT MEDICAL EXAMINATION WITHOUT ABNORMAL FINDINGS: ICD-10-CM

## 2024-08-14 RX ORDER — EZETIMIBE 10 MG/1
10 TABLET ORAL DAILY
Qty: 90 TABLET | Refills: 3 | Status: SHIPPED | OUTPATIENT
Start: 2024-08-14

## 2024-08-14 RX ORDER — NEBIVOLOL 10 MG/1
10 TABLET ORAL DAILY
Qty: 90 TABLET | Refills: 1 | Status: SHIPPED | OUTPATIENT
Start: 2024-08-14

## 2024-09-10 DIAGNOSIS — Z00.00 ENCOUNTER FOR GENERAL ADULT MEDICAL EXAMINATION WITHOUT ABNORMAL FINDINGS: ICD-10-CM

## 2024-09-10 RX ORDER — RAMIPRIL 10 MG/1
CAPSULE ORAL
Qty: 180 CAPSULE | Refills: 1 | Status: SHIPPED | OUTPATIENT
Start: 2024-09-10

## 2024-09-22 DIAGNOSIS — F32.A DEPRESSION, UNSPECIFIED: ICD-10-CM

## 2024-09-23 RX ORDER — VENLAFAXINE HYDROCHLORIDE 150 MG/1
150 CAPSULE, EXTENDED RELEASE ORAL DAILY
Qty: 90 CAPSULE | Refills: 0 | Status: SHIPPED | OUTPATIENT
Start: 2024-09-23

## 2024-10-02 ENCOUNTER — DOCUMENTATION (OUTPATIENT)
Dept: CARDIOLOGY | Facility: HOSPITAL | Age: 74
End: 2024-10-02
Payer: MEDICARE

## 2024-10-02 NOTE — PROGRESS NOTES
Spoke with patient via phone for the 2 year [employer] AF trial follow up. No Aes or Med changes to report. MRS 0. Patient doing well.

## 2024-10-14 DIAGNOSIS — Z00.00 ENCOUNTER FOR GENERAL ADULT MEDICAL EXAMINATION WITHOUT ABNORMAL FINDINGS: ICD-10-CM

## 2024-10-14 RX ORDER — ATORVASTATIN CALCIUM 80 MG/1
TABLET, FILM COATED ORAL
Qty: 90 TABLET | Refills: 1 | Status: SHIPPED | OUTPATIENT
Start: 2024-10-14

## 2024-10-21 ENCOUNTER — APPOINTMENT (OUTPATIENT)
Dept: PODIATRY | Facility: CLINIC | Age: 74
End: 2024-10-21
Payer: MEDICARE

## 2024-12-21 DIAGNOSIS — F32.A DEPRESSION, UNSPECIFIED: ICD-10-CM

## 2024-12-23 RX ORDER — VENLAFAXINE HYDROCHLORIDE 150 MG/1
150 CAPSULE, EXTENDED RELEASE ORAL DAILY
Qty: 90 CAPSULE | Refills: 0 | Status: SHIPPED | OUTPATIENT
Start: 2024-12-23

## 2025-01-07 DIAGNOSIS — F32.A DEPRESSION, UNSPECIFIED: ICD-10-CM

## 2025-01-07 RX ORDER — VENLAFAXINE HYDROCHLORIDE 150 MG/1
150 CAPSULE, EXTENDED RELEASE ORAL DAILY
Qty: 90 CAPSULE | Refills: 0 | Status: SHIPPED | OUTPATIENT
Start: 2025-01-07

## 2025-02-11 ENCOUNTER — APPOINTMENT (OUTPATIENT)
Dept: PRIMARY CARE | Facility: CLINIC | Age: 75
End: 2025-02-11
Payer: MEDICARE

## 2025-02-11 VITALS
DIASTOLIC BLOOD PRESSURE: 78 MMHG | HEIGHT: 74 IN | SYSTOLIC BLOOD PRESSURE: 124 MMHG | BODY MASS INDEX: 29.77 KG/M2 | WEIGHT: 232 LBS

## 2025-02-11 DIAGNOSIS — Z13.220 LIPID SCREENING: ICD-10-CM

## 2025-02-11 DIAGNOSIS — I10 BENIGN ESSENTIAL HTN: ICD-10-CM

## 2025-02-11 DIAGNOSIS — Z00.00 ROUTINE GENERAL MEDICAL EXAMINATION AT HEALTH CARE FACILITY: Primary | ICD-10-CM

## 2025-02-11 DIAGNOSIS — F32.A DEPRESSION, UNSPECIFIED DEPRESSION TYPE: ICD-10-CM

## 2025-02-11 DIAGNOSIS — R73.03 PRE-DIABETES: ICD-10-CM

## 2025-02-11 DIAGNOSIS — Z00.00 ENCOUNTER FOR GENERAL ADULT MEDICAL EXAMINATION WITHOUT ABNORMAL FINDINGS: ICD-10-CM

## 2025-02-11 DIAGNOSIS — R35.0 BENIGN PROSTATIC HYPERPLASIA WITH URINARY FREQUENCY: ICD-10-CM

## 2025-02-11 DIAGNOSIS — N40.1 BENIGN PROSTATIC HYPERPLASIA WITH URINARY FREQUENCY: ICD-10-CM

## 2025-02-11 RX ORDER — RAMIPRIL 10 MG/1
10 CAPSULE ORAL DAILY
Start: 2025-02-11

## 2025-02-11 RX ORDER — NEBIVOLOL 10 MG/1
10 TABLET ORAL DAILY
Qty: 90 TABLET | Refills: 3 | Status: SHIPPED | OUTPATIENT
Start: 2025-02-11

## 2025-02-11 ASSESSMENT — ACTIVITIES OF DAILY LIVING (ADL)
GROCERY_SHOPPING: INDEPENDENT
BATHING: INDEPENDENT
DOING_HOUSEWORK: INDEPENDENT
MANAGING_FINANCES: INDEPENDENT
TAKING_MEDICATION: INDEPENDENT
DRESSING: INDEPENDENT

## 2025-02-11 ASSESSMENT — ENCOUNTER SYMPTOMS
OCCASIONAL FEELINGS OF UNSTEADINESS: 0
DEPRESSION: 0
LOSS OF SENSATION IN FEET: 0

## 2025-02-11 NOTE — PROGRESS NOTES
"Subjective   Patient ID: Kevin Mike is a 74 y.o. male who presents for Medicare Annual Wellness Visit Subsequent.    [facility]   Routine fu and Wellness exam.    Med refill.    He has been seeing ophthalmology for recurrent retinal detachment.    BP has been stable.  Review of Systems  12-point ROS reviewed and was negative unless otherwise noted in HPI.    Objective   /78   Ht 1.88 m (6' 2\")   Wt 105 kg (232 lb)   BMI 29.79 kg/m²     Physical Exam  GEN: conversant, NAD  HEENT: PERRL, EOMI, MMM  NECK: supple, no carotid bruits appreciated b/l  CV: S1, S2, RRR  PULM: CTAB  ABD: soft, NT, ND  NEURO: no new gross focal deficits  EXT: no sig LE edema  PSYCH: appropriate affect    Assessment/Plan     - CAD s/p CABG in 1990s  - AAA s/p repair 2008  - Afib s/p Watchman 2022  - Thoracic aortic aneurysm being monitored by cardiology and thoracic surgery  - S/P  L4-L5 OPEN TLIF 6/2023   - Varicose Veins  - HLD  - HTN  - pre-diabetes  - overweight  - depression  - h/o cigarette smoking  - Colon polyps - repeat colonoscopy 2026     PLAN:  BP stable.     Seeing ophthalmology.    Advised pt to use compression stocking and follow up with vascular surgery     #Health maintenance: colon cancer screening repeat due 2026.  Advised pneumonia vaccine, Shingrix, COVID-19, RSV vaccines. Advised yearly flu shot. Longitudinal care.     RTC 6 mo      "

## 2025-03-04 DIAGNOSIS — Z00.00 ENCOUNTER FOR GENERAL ADULT MEDICAL EXAMINATION WITHOUT ABNORMAL FINDINGS: ICD-10-CM

## 2025-03-05 RX ORDER — ATORVASTATIN CALCIUM 80 MG/1
80 TABLET, FILM COATED ORAL DAILY
Qty: 90 TABLET | Refills: 1 | Status: SHIPPED | OUTPATIENT
Start: 2025-03-05

## 2025-03-24 DIAGNOSIS — F32.A DEPRESSION, UNSPECIFIED: ICD-10-CM

## 2025-03-24 RX ORDER — VENLAFAXINE HYDROCHLORIDE 150 MG/1
150 CAPSULE, EXTENDED RELEASE ORAL DAILY
Qty: 90 CAPSULE | Refills: 0 | Status: SHIPPED | OUTPATIENT
Start: 2025-03-24

## 2025-04-05 ENCOUNTER — HOSPITAL ENCOUNTER (EMERGENCY)
Facility: HOSPITAL | Age: 75
Discharge: HOME | End: 2025-04-06
Attending: EMERGENCY MEDICINE
Payer: MEDICARE

## 2025-04-05 ENCOUNTER — APPOINTMENT (OUTPATIENT)
Dept: CARDIOLOGY | Facility: HOSPITAL | Age: 75
End: 2025-04-05
Payer: MEDICARE

## 2025-04-05 ENCOUNTER — APPOINTMENT (OUTPATIENT)
Dept: RADIOLOGY | Facility: HOSPITAL | Age: 75
End: 2025-04-05
Payer: MEDICARE

## 2025-04-05 DIAGNOSIS — R42 DIZZINESS: Primary | ICD-10-CM

## 2025-04-05 LAB
ALBUMIN SERPL BCP-MCNC: 3.9 G/DL (ref 3.4–5)
ALP SERPL-CCNC: 65 U/L (ref 33–136)
ALT SERPL W P-5'-P-CCNC: 17 U/L (ref 10–52)
ANION GAP SERPL CALC-SCNC: 17 MMOL/L (ref 10–20)
AST SERPL W P-5'-P-CCNC: 21 U/L (ref 9–39)
BASOPHILS # BLD AUTO: 0.05 X10*3/UL (ref 0–0.1)
BASOPHILS NFR BLD AUTO: 0.7 %
BILIRUB SERPL-MCNC: 0.5 MG/DL (ref 0–1.2)
BUN SERPL-MCNC: 15 MG/DL (ref 6–23)
CALCIUM SERPL-MCNC: 9 MG/DL (ref 8.6–10.3)
CARDIAC TROPONIN I PNL SERPL HS: 10 NG/L (ref 0–20)
CARDIAC TROPONIN I PNL SERPL HS: 15 NG/L (ref 0–20)
CHLORIDE SERPL-SCNC: 99 MMOL/L (ref 98–107)
CO2 SERPL-SCNC: 20 MMOL/L (ref 21–32)
CREAT SERPL-MCNC: 0.77 MG/DL (ref 0.5–1.3)
EGFRCR SERPLBLD CKD-EPI 2021: >90 ML/MIN/1.73M*2
EOSINOPHIL # BLD AUTO: 0.09 X10*3/UL (ref 0–0.4)
EOSINOPHIL NFR BLD AUTO: 1.3 %
ERYTHROCYTE [DISTWIDTH] IN BLOOD BY AUTOMATED COUNT: 12.4 % (ref 11.5–14.5)
ETHANOL SERPL-MCNC: 134 MG/DL
GLUCOSE BLD MANUAL STRIP-MCNC: 107 MG/DL (ref 74–99)
GLUCOSE SERPL-MCNC: 101 MG/DL (ref 74–99)
HCT VFR BLD AUTO: 41.3 % (ref 41–52)
HGB BLD-MCNC: 13.4 G/DL (ref 13.5–17.5)
HOLD SPECIMEN: NORMAL
HOLD SPECIMEN: NORMAL
IMM GRANULOCYTES # BLD AUTO: 0.05 X10*3/UL (ref 0–0.5)
IMM GRANULOCYTES NFR BLD AUTO: 0.7 % (ref 0–0.9)
LYMPHOCYTES # BLD AUTO: 1.68 X10*3/UL (ref 0.8–3)
LYMPHOCYTES NFR BLD AUTO: 24.4 %
MCH RBC QN AUTO: 32.6 PG (ref 26–34)
MCHC RBC AUTO-ENTMCNC: 32.4 G/DL (ref 32–36)
MCV RBC AUTO: 101 FL (ref 80–100)
MONOCYTES # BLD AUTO: 0.79 X10*3/UL (ref 0.05–0.8)
MONOCYTES NFR BLD AUTO: 11.5 %
NEUTROPHILS # BLD AUTO: 4.23 X10*3/UL (ref 1.6–5.5)
NEUTROPHILS NFR BLD AUTO: 61.4 %
NRBC BLD-RTO: 0 /100 WBCS (ref 0–0)
PLATELET # BLD AUTO: 175 X10*3/UL (ref 150–450)
POTASSIUM SERPL-SCNC: 3.8 MMOL/L (ref 3.5–5.3)
PROT SERPL-MCNC: 6.6 G/DL (ref 6.4–8.2)
RBC # BLD AUTO: 4.11 X10*6/UL (ref 4.5–5.9)
SODIUM SERPL-SCNC: 132 MMOL/L (ref 136–145)
WBC # BLD AUTO: 6.9 X10*3/UL (ref 4.4–11.3)

## 2025-04-05 PROCEDURE — 36415 COLL VENOUS BLD VENIPUNCTURE: CPT | Performed by: STUDENT IN AN ORGANIZED HEALTH CARE EDUCATION/TRAINING PROGRAM

## 2025-04-05 PROCEDURE — 93005 ELECTROCARDIOGRAM TRACING: CPT

## 2025-04-05 PROCEDURE — 71045 X-RAY EXAM CHEST 1 VIEW: CPT | Performed by: STUDENT IN AN ORGANIZED HEALTH CARE EDUCATION/TRAINING PROGRAM

## 2025-04-05 PROCEDURE — 71045 X-RAY EXAM CHEST 1 VIEW: CPT

## 2025-04-05 PROCEDURE — 84484 ASSAY OF TROPONIN QUANT: CPT | Performed by: STUDENT IN AN ORGANIZED HEALTH CARE EDUCATION/TRAINING PROGRAM

## 2025-04-05 PROCEDURE — 85025 COMPLETE CBC W/AUTO DIFF WBC: CPT | Performed by: STUDENT IN AN ORGANIZED HEALTH CARE EDUCATION/TRAINING PROGRAM

## 2025-04-05 PROCEDURE — 70450 CT HEAD/BRAIN W/O DYE: CPT

## 2025-04-05 PROCEDURE — 82077 ASSAY SPEC XCP UR&BREATH IA: CPT | Performed by: EMERGENCY MEDICINE

## 2025-04-05 PROCEDURE — 82947 ASSAY GLUCOSE BLOOD QUANT: CPT

## 2025-04-05 PROCEDURE — 70450 CT HEAD/BRAIN W/O DYE: CPT | Performed by: STUDENT IN AN ORGANIZED HEALTH CARE EDUCATION/TRAINING PROGRAM

## 2025-04-05 PROCEDURE — 80053 COMPREHEN METABOLIC PANEL: CPT | Performed by: STUDENT IN AN ORGANIZED HEALTH CARE EDUCATION/TRAINING PROGRAM

## 2025-04-05 PROCEDURE — 99285 EMERGENCY DEPT VISIT HI MDM: CPT | Mod: 25 | Performed by: EMERGENCY MEDICINE

## 2025-04-05 ASSESSMENT — PAIN SCALES - GENERAL
PAINLEVEL_OUTOF10: 0 - NO PAIN

## 2025-04-05 ASSESSMENT — LIFESTYLE VARIABLES
TOTAL SCORE: 0
EVER FELT BAD OR GUILTY ABOUT YOUR DRINKING: NO
EVER HAD A DRINK FIRST THING IN THE MORNING TO STEADY YOUR NERVES TO GET RID OF A HANGOVER: NO
HAVE PEOPLE ANNOYED YOU BY CRITICIZING YOUR DRINKING: NO
HAVE YOU EVER FELT YOU SHOULD CUT DOWN ON YOUR DRINKING: NO

## 2025-04-05 ASSESSMENT — COLUMBIA-SUICIDE SEVERITY RATING SCALE - C-SSRS
6. HAVE YOU EVER DONE ANYTHING, STARTED TO DO ANYTHING, OR PREPARED TO DO ANYTHING TO END YOUR LIFE?: NO
1. IN THE PAST MONTH, HAVE YOU WISHED YOU WERE DEAD OR WISHED YOU COULD GO TO SLEEP AND NOT WAKE UP?: NO
2. HAVE YOU ACTUALLY HAD ANY THOUGHTS OF KILLING YOURSELF?: NO

## 2025-04-05 ASSESSMENT — PAIN - FUNCTIONAL ASSESSMENT: PAIN_FUNCTIONAL_ASSESSMENT: 0-10

## 2025-04-06 VITALS
TEMPERATURE: 97.7 F | BODY MASS INDEX: 30.8 KG/M2 | DIASTOLIC BLOOD PRESSURE: 62 MMHG | RESPIRATION RATE: 20 BRPM | HEART RATE: 74 BPM | HEIGHT: 74 IN | SYSTOLIC BLOOD PRESSURE: 110 MMHG | WEIGHT: 240 LBS | OXYGEN SATURATION: 97 %

## 2025-04-06 ASSESSMENT — PAIN SCALES - GENERAL: PAINLEVEL_OUTOF10: 0 - NO PAIN

## 2025-04-06 ASSESSMENT — PAIN - FUNCTIONAL ASSESSMENT: PAIN_FUNCTIONAL_ASSESSMENT: 0-10

## 2025-04-06 NOTE — ED PROVIDER NOTES
HPI   Chief Complaint   Patient presents with   • Dizziness     PT. ARRIVED VIA EMS TO ED FROM [facility] FOR DIZZINESS W/ NAUSEA. PT. STATES HE DRINKS DAILY, ROUGHLY 4-8 SELTZERS, DRANK THAT AND THEN HAD A FEW SHOTS AND 2 GLASSES OF WINE AT [facility]. PT. STATES HE FELT DIZZY, SAT DOWN, VOMITED. EMS GAVE 4MG OF ZOFRAN, PT. NO LONGER NAUSEATED, SLIGHTLY DIZZY UPON ARRIVAL. EKG READING NSR W/ HR OF 72.        This is a 74 years old male patient presented to the emergency department by andry with a chief complaint of dizziness, nausea, vomiting, and episode of diaphoresis.  Happen after he drank alcohol at a .  That he usually drinks daily.  He was sitting down, felt like he is about to faint, was sweaty, denies any headaches, neck pain, chest pain, shortness of breath, cough, weakness, focal numbness, fever, chills, body aches, or urinary symptoms.  Stated that he vomited 2 times and both were nonbloody.  Patient denies fall, or head injury.  He has history of coronary artery disease status post CABG, and AAA.    Review of system: As stated above in the HPI section.              Patient History   Past Medical History:   Diagnosis Date   • AAA (abdominal aortic aneurysm) 04/04/2023    Repair 2010   • Benign essential HTN 04/04/2023   • Coronary artery disease 04/04/2023    CABG 1992 [place] x 3 vessels. Cath 2019 left main 99% stenosis, LAD occluded, circumflex 80% proximal 95% mid, RCA totally occluded, LIMA to the LAD patent, SVG to the mid circumflex chronically occluded, SVG to the right PDA chronically occluded. Status post LATOYA left main going into the circumflex with loss of the ramus   • Hypercholesterolemia 04/04/2023    Dr. Chino   • Persistent atrial fibrillation (Multi) 04/04/2023 1999 , then again 2019 on Xarelto   • SOB (shortness of breath) on exertion 04/04/2023   • ST elevation (STEMI) myocardial infarction involving other coronary artery of inferior wall (Multi) 04/04/2023 2010   •  Thoracic aortic aneurysm without rupture 04/04/2023     Past Surgical History:   Procedure Laterality Date   • OTHER SURGICAL HISTORY  04/19/2022    Cardiac catheterization with stent placement   • OTHER SURGICAL HISTORY  04/19/2022    Coronary artery bypass graft   • OTHER SURGICAL HISTORY  04/19/2022    Colonoscopy   • OTHER SURGICAL HISTORY  04/19/2022    Cardioversion   • OTHER SURGICAL HISTORY  04/19/2022    Abdominal aortic aneurysm repair   • OTHER SURGICAL HISTORY  09/13/2022    Atrial appendage closure device insertion     Family History   Problem Relation Name Age of Onset   • Other (cardiac disorder) Father     • Heart attack Father  66     Social History     Tobacco Use   • Smoking status: Former     Types: Cigarettes   • Smokeless tobacco: Never   Vaping Use   • Vaping status: Never Used   Substance Use Topics   • Alcohol use: Yes     Comment: moderate   • Drug use: Not Currently       Physical Exam   ED Triage Vitals [04/05/25 2046]   Temperature Heart Rate Respirations BP   36.4 °C (97.5 °F) 72 18 116/68      Pulse Ox Temp Source Heart Rate Source Patient Position   94 % Temporal -- Sitting      BP Location FiO2 (%)     Right arm --       Physical Exam  Vitals and nursing note reviewed.   Constitutional:       General: He is not in acute distress.     Appearance: He is well-developed.   HENT:      Head: Normocephalic and atraumatic.   Eyes:      Conjunctiva/sclera: Conjunctivae normal.   Neck:      Comments: No midline tenderness of the cervical, thoracic, lumbar, or sacral region.  Cardiovascular:      Rate and Rhythm: Normal rate and regular rhythm.      Heart sounds: No murmur heard.  Pulmonary:      Effort: Pulmonary effort is normal. No respiratory distress.      Breath sounds: Normal breath sounds.   Abdominal:      Palpations: Abdomen is soft.      Tenderness: There is no abdominal tenderness.   Musculoskeletal:         General: No swelling.      Cervical back: Neck supple.   Skin:     General:  Skin is warm and dry.      Capillary Refill: Capillary refill takes less than 2 seconds.   Neurological:      General: No focal deficit present.      Mental Status: He is alert and oriented to person, place, and time.      Comments: Stroke scale of 0   Psychiatric:         Mood and Affect: Mood normal.           ED Course & MDM   Diagnoses as of 04/05/25 2557   Dizziness                 No data recorded     Maura Coma Scale Score: 15 (04/05/25 2050 : Carolina Harris RN)                           Medical Decision Making  Patient seen and examined, given his dizziness/lightheadedness as well as vomiting will obtain cardiac workup, he received 4 mg of Zofran and currently denying any nausea.  His NIH stroke scale is 0.  Given the patient's age and comorbid conditions will obtain cardiac workup, CT head, chest x-ray, and basic labs.    EKG revealed normal sinus rhythm, rate is 74 bpm, normal KS, QRS, normal QTc duration.  No ST segment or T wave pathology.  Good R wave progression throughout the precordial leads.    Patient passed p.o. challenge, workup is unremarkable, presentation is likely secondary to the EtOH intoxication.  Patient has a sober ride home.  Will be able to discharge the patient home to follow-up with a primary care provider with instruction to return to the emergency department if alarming symptoms arise as per discharge instruction        Procedure  Procedures     Randall Santos DO  04/05/25 [id number]

## 2025-04-06 NOTE — DISCHARGE INSTRUCTIONS
Please follow-up with your primary care provider in 2 to 3 days.  Return immediately to the emergency department if develop any recurrent episodes of dizziness, lightheadedness, weakness, numbness, pain, chest pain, shortness of breath, or if concerns arise.

## 2025-04-07 LAB
ATRIAL RATE: 72 BPM
ATRIAL RATE: 74 BPM
P AXIS: 61 DEGREES
P AXIS: 65 DEGREES
P OFFSET: 156 MS
P OFFSET: 156 MS
P ONSET: 100 MS
P ONSET: 100 MS
PR INTERVAL: 160 MS
PR INTERVAL: 168 MS
Q ONSET: 211 MS
Q ONSET: 213 MS
QRS COUNT: 12 BEATS
QRS COUNT: 12 BEATS
QRS DURATION: 102 MS
QRS DURATION: 98 MS
QT INTERVAL: 412 MS
QT INTERVAL: 418 MS
QTC CALCULATION(BAZETT): 457 MS
QTC CALCULATION(BAZETT): 457 MS
QTC FREDERICIA: 442 MS
QTC FREDERICIA: 444 MS
R AXIS: 26 DEGREES
R AXIS: 46 DEGREES
T AXIS: -1 DEGREES
T AXIS: -19 DEGREES
T OFFSET: 419 MS
T OFFSET: 420 MS
VENTRICULAR RATE: 72 BPM
VENTRICULAR RATE: 74 BPM

## 2025-05-21 LAB
ALBUMIN SERPL-MCNC: 4.4 G/DL (ref 3.6–5.1)
ALP SERPL-CCNC: 81 U/L (ref 35–144)
ALT SERPL-CCNC: 24 U/L (ref 9–46)
ANION GAP SERPL CALCULATED.4IONS-SCNC: 10 MMOL/L (CALC) (ref 7–17)
AST SERPL-CCNC: 17 U/L (ref 10–35)
BILIRUB SERPL-MCNC: 1 MG/DL (ref 0.2–1.2)
BUN SERPL-MCNC: 20 MG/DL (ref 7–25)
CALCIUM SERPL-MCNC: 9.1 MG/DL (ref 8.6–10.3)
CHLORIDE SERPL-SCNC: 98 MMOL/L (ref 98–110)
CHOLEST SERPL-MCNC: 140 MG/DL
CHOLEST/HDLC SERPL: 3.1 (CALC)
CO2 SERPL-SCNC: 27 MMOL/L (ref 20–32)
CREAT SERPL-MCNC: 0.88 MG/DL (ref 0.7–1.28)
EGFRCR SERPLBLD CKD-EPI 2021: 91 ML/MIN/1.73M2
ERYTHROCYTE [DISTWIDTH] IN BLOOD BY AUTOMATED COUNT: 12.4 % (ref 11–15)
EST. AVERAGE GLUCOSE BLD GHB EST-MCNC: 111 MG/DL
EST. AVERAGE GLUCOSE BLD GHB EST-SCNC: 6.2 MMOL/L
GLUCOSE SERPL-MCNC: 92 MG/DL (ref 65–99)
HBA1C MFR BLD: 5.5 %
HCT VFR BLD AUTO: 49.7 % (ref 38.5–50)
HDLC SERPL-MCNC: 45 MG/DL
HGB BLD-MCNC: 16.5 G/DL (ref 13.2–17.1)
LDLC SERPL CALC-MCNC: 76 MG/DL (CALC)
MCH RBC QN AUTO: 32.1 PG (ref 27–33)
MCHC RBC AUTO-ENTMCNC: 33.2 G/DL (ref 32–36)
MCV RBC AUTO: 96.7 FL (ref 80–100)
NONHDLC SERPL-MCNC: 95 MG/DL (CALC)
PLATELET # BLD AUTO: 143 THOUSAND/UL (ref 140–400)
PMV BLD REES-ECKER: 9.8 FL (ref 7.5–12.5)
POTASSIUM SERPL-SCNC: 4.5 MMOL/L (ref 3.5–5.3)
PROT SERPL-MCNC: 7 G/DL (ref 6.1–8.1)
RBC # BLD AUTO: 5.14 MILLION/UL (ref 4.2–5.8)
SODIUM SERPL-SCNC: 135 MMOL/L (ref 135–146)
TRIGL SERPL-MCNC: 108 MG/DL
WBC # BLD AUTO: 11.4 THOUSAND/UL (ref 3.8–10.8)

## 2025-07-06 DIAGNOSIS — I71.20 THORACIC AORTIC ANEURYSM, WITHOUT RUPTURE, UNSPECIFIED: ICD-10-CM

## 2025-07-07 RX ORDER — EZETIMIBE 10 MG/1
10 TABLET ORAL DAILY
COMMUNITY
Start: 2025-07-07

## 2025-07-11 LAB
CREAT SERPL-MCNC: 0.91 MG/DL (ref 0.7–1.28)
EGFRCR SERPLBLD CKD-EPI 2021: 89 ML/MIN/1.73M2

## 2025-07-14 ENCOUNTER — OFFICE VISIT (OUTPATIENT)
Dept: CARDIOLOGY | Facility: CLINIC | Age: 75
End: 2025-07-14
Payer: MEDICARE

## 2025-07-14 ENCOUNTER — HOSPITAL ENCOUNTER (OUTPATIENT)
Dept: RADIOLOGY | Facility: HOSPITAL | Age: 75
Discharge: HOME | End: 2025-07-14
Payer: MEDICARE

## 2025-07-14 VITALS
SYSTOLIC BLOOD PRESSURE: 130 MMHG | HEIGHT: 74 IN | OXYGEN SATURATION: 97 % | DIASTOLIC BLOOD PRESSURE: 80 MMHG | WEIGHT: 235 LBS | BODY MASS INDEX: 30.16 KG/M2 | HEART RATE: 71 BPM

## 2025-07-14 DIAGNOSIS — I48.19 PERSISTENT ATRIAL FIBRILLATION (MULTI): ICD-10-CM

## 2025-07-14 DIAGNOSIS — E78.00 HYPERCHOLESTEROLEMIA: ICD-10-CM

## 2025-07-14 DIAGNOSIS — Z95.1 S/P CORONARY ARTERY BYPASS GRAFT X 3: ICD-10-CM

## 2025-07-14 DIAGNOSIS — I10 BENIGN ESSENTIAL HTN: ICD-10-CM

## 2025-07-14 DIAGNOSIS — I25.10 CORONARY ARTERY DISEASE INVOLVING NATIVE CORONARY ARTERY OF NATIVE HEART WITHOUT ANGINA PECTORIS: Primary | ICD-10-CM

## 2025-07-14 DIAGNOSIS — Z95.818 PRESENCE OF OTHER CARDIAC IMPLANTS AND GRAFTS: ICD-10-CM

## 2025-07-14 DIAGNOSIS — Z95.5 S/P CORONARY ARTERY STENT PLACEMENT: ICD-10-CM

## 2025-07-14 DIAGNOSIS — I71.21 ANEURYSM OF ASCENDING AORTA WITHOUT RUPTURE: ICD-10-CM

## 2025-07-14 DIAGNOSIS — I71.21 ANEURYSM OF ASCENDING AORTA WITHOUT RUPTURE: Chronic | ICD-10-CM

## 2025-07-14 DIAGNOSIS — J43.9 PULMONARY EMPHYSEMA, UNSPECIFIED EMPHYSEMA TYPE (MULTI): ICD-10-CM

## 2025-07-14 PROCEDURE — 71275 CT ANGIOGRAPHY CHEST: CPT

## 2025-07-14 PROCEDURE — 2550000001 HC RX 255 CONTRASTS

## 2025-07-14 PROCEDURE — 99212 OFFICE O/P EST SF 10 MIN: CPT | Mod: 25

## 2025-07-14 RX ORDER — ASPIRIN 81 MG/1
81 TABLET ORAL DAILY
COMMUNITY

## 2025-07-14 RX ADMIN — IOHEXOL 75 ML: 350 INJECTION, SOLUTION INTRAVENOUS at 10:55

## 2025-07-14 NOTE — PROGRESS NOTES
Chief complaint  Annual follow-up    HPI   I am seeing Kevin today for his routine annual follow-up.  He reports he has been feeling well since his last visit and denies any new cardiac symptoms. He denies any CP, SOB, palpitations, lightheadedness, syncope, orthopnea, PND, lower extremity edema. He admits he has not been very physically active due to some back problems, however he does tell me he was recently on a cruise in [place] and was walking few miles daily.     Past Medical History:  Past Medical History:   Diagnosis Date    AAA (abdominal aortic aneurysm) 04/04/2023    Repair 2010    Benign essential HTN 04/04/2023    Coronary artery disease 04/04/2023    CABG 1992 [place] x 3 vessels. Cath 2019 left main 99% stenosis, LAD occluded, circumflex 80% proximal 95% mid, RCA totally occluded, LIMA to the LAD patent, SVG to the mid circumflex chronically occluded, SVG to the right PDA chronically occluded. Status post LATOYA left main going into the circumflex with loss of the ramus    Hypercholesterolemia 04/04/2023    Dr. Chino    Persistent atrial fibrillation (Multi) 04/04/2023 1999 , then again 2019 on Xarelto    SOB (shortness of breath) on exertion 04/04/2023    ST elevation (STEMI) myocardial infarction involving other coronary artery of inferior wall (Multi) 04/04/2023    2010    Thoracic aortic aneurysm without rupture 04/04/2023      Past Surgical History:  He has a past surgical history that includes Other surgical history (04/19/2022); Other surgical history (04/19/2022); Other surgical history (04/19/2022); Other surgical history (04/19/2022); Other surgical history (04/19/2022); and Other surgical history (09/13/2022).      Social History:  He reports that he has quit smoking. His smoking use included cigarettes. He has never used smokeless tobacco. He reports current alcohol use. He reports that he does not currently use drugs.    Family History:  Family History   Problem Relation Name Age of Onset  "   Other (cardiac disorder) Father      Heart attack Father  66     Allergies:  Patient has no known allergies.    Outpatient Medications:  Current Outpatient Medications   Medication Instructions    atorvastatin (LIPITOR) 80 mg, oral, Daily    ezetimibe (ZETIA) 10 mg, oral, Daily    nebivolol (BYSTOLIC) 10 mg, oral, Daily    ramipril (ALTACE) 10 mg, oral, Daily    tamsulosin (FLOMAX) 0.4 mg, oral, Daily    venlafaxine XR (EFFEXOR-XR) 150 mg, oral, Daily, Swallow whole. Do not crush or chew.     Last Recorded Vitals:  Vitals:    07/14/25 0908   BP: 130/80   Pulse: 71   SpO2: 97%   Weight: 107 kg (235 lb)   Height: 1.88 m (6' 2\")     Physical Exam  General: no acute distress  HEENT: EOMI, no scleral icterus.  Lungs: Clear to auscultation bilaterally without wheezing, rales, or rhonchi.  Cardiovascular: Regular rhythm and rate. Normal S1 and S2. No murmurs, rubs, or gallops are appreciated. JVP normal.  Abdomen: Soft, nontender, nondistended. Bowel sounds present.  Extremities: Warm and well perfused with equal 2+ pulses bilaterally.  No edema.  Neurologic: Alert and oriented x3.     Last Labs:  CBC -  Lab Results   Component Value Date    WBC 11.4 (H) 05/21/2025    HGB 16.5 05/21/2025    HCT 49.7 05/21/2025    MCV 96.7 05/21/2025     05/21/2025     CMP -  Lab Results   Component Value Date    CALCIUM 9.1 05/21/2025    PHOS CANCELED 06/16/2023    PROT 7.0 05/21/2025    ALBUMIN 4.4 05/21/2025    AST 17 05/21/2025    ALT 24 05/21/2025    ALKPHOS 81 05/21/2025    BILITOT 1.0 05/21/2025     LIPID PANEL -   Lab Results   Component Value Date    CHOL 140 05/21/2025    HDL 45 05/21/2025    CHHDL 3.1 05/21/2025    VLDL 29 02/20/2024    TRIG 108 05/21/2025    NHDL 95 05/21/2025     RENAL FUNCTION PANEL -   Lab Results   Component Value Date    K 4.5 05/21/2025    PHOS CANCELED 06/16/2023     Lab Results   Component Value Date    HGBA1C 5.5 05/21/2025     Procedure  ECHO 7/9/2024: Mildly decreased LV function EF 50%, " moderate concentric LVH, mildly dilated left atrium, mild aortic valve regurgitation, mild dilation of aortic root and ascending aorta measuring 4 cm and root 4.6 cm.    CT Watchman [12/22/2022]: S/P LA occlusion (Watchman device deployment). Well seated LAAO device w/no ev/of sig contrast opacification w/in or distal to device. No ev/of cristiana-device leak. No ev/of LA thrombus. Three-vessel CAD, s/p PCI & CABG. Current study not optimized for assessment of coronary artery patency.     CT Watchman device [08/26/2022]: No STEVAN thrombus noted. S/p CABG. LIMA to LAD appears patent in vis'd segment. Aorta to OM and RCA appear occluded. Severe native coronary artery calcifications. Severe aortic atherosclerosis. Dilated descending thoracic aorta @ 3.9 cm.     ECHO [08/26/2022]: EF 50-55%. Pulm artery not well vis'd.      LAAC [08/26/2022, Dr. Mando Brady]: Successful transcatheter left atrial appendage closure with 31 mm Watchman FLX device     EX NST [05/19/2022]: 5 min 1 sec (7 METs) . . . Abnormal = ev/for prior inferoposterior wall MI w/o residual cristiana-infarct ischemia. WMAs, EF 43%.     ECHO [05/13/2022]: Est EF 50%. SD = impaired relaxation pattern. Mild AR. Aorta dial @ 4.6 cm at Sinus of Valsalva and 4.1 cm in ascending aorta.      ECHO [04/01/2021]: EF 60%, akinesis basal inferior and inferoseptal wall,     CVN [12/23/2019, Dr. Mansoor Rutledge]: Successful CVN of A-fib to sinus rhythm (200J). Resume outpt cardiac medications. Can discontinue isosorbide. Followup as scheduled.     PCI [12/10/2019, Dr. Mansoor Rutledge]: 3 vessel CAD; VG to OM and VG to RPDA occluded; Successful PCI / DESx2 from mid circumflex to ostial left main. Cath 2019 left main 99% stenosis, LAD occluded, circumflex 80% proximal 95% mid, RCA totally occluded, LIMA to the LAD patent, SVG to the mid circumflex chronically occluded, SVG to the right PDA chronically occluded. Status post LATOYA left main going into the circumflex with loss of the ramus      AAA repair 2008     CABG 1995     Assessment/Plan   CAD, s/p CABG x 3 (VG to OM, VG to RPDA, and LIMA to LAD) in 1990s in [place].  Cardiac catheterization in 2019 revealed 2/3 bypass grafts are occluded and patent LIMA to the LAD. MI s/p PCI stenting left main into the circumflex with loss of ramus in 2019.  His most recent stress testing in May 2022 he was only able to walk about 5 minutes and was noted to have inferior posterior scar with no residual for ischemia and EF of 43%.  He denies any complaints of chest pain today's visit whatsoever.  Continue with continue aspirin, statin, BB, and ramipril.    Cardiomyopathy, ischemic.  EF on stress testing was 43% (5/19/2022).  Most recent echocardiogram revealed mildly decreased LV function EF 50%, moderate concentric LVH, mildly dilated left atrium, and mild aortic valve regurgitation (7/9/2024). He appears to be euvolemic on exam and reports his weights remained stable.  Continue verapamil and Bystolic.      AR, mild aortic valve regurgitation noted most recent echocardiogram (7/9/2024). Valvular disorder appears hemodynamically insignificant at this time and patient is asymptomatic.  Continue to monitor.    HTN, stable, /80 today.  He is on Nebivolol 10 mg daily and ramipril 10 mg daily.    HLD, 5/21/2025 LDL 76, HDL 45, triglycerides 108.  He is on atorvastatin 80 mg daily and ezetimibe 10 mg daily.  Goal LDL <70. Discussed the importance of lifestyle modifications, weight loss, low saturated fat, low sodium diet and exercising for 30 mins/day.  If he is still not at goal we can consider switching him to Repatha.  Repeat fasting blood work can be done with his PCP.    Paroxysmal atrial fibrillation, patient reports he was initially found to be in A-fib back in 1999.  He is ss/p watchman implant for the [place] AF study. Not on Xarelto or Plavix or Eliquis. Status post ablation for A-fib in April 2023 with Dr. Markham.  Patient denies any further  recurrence of A-fib.    TAA, 4.1 cm in the ascending aorta and 4.6 cm sinuses Valsalva. Repeat echo revealed mildly dilated aortic root measuring 4.6 cm and ascending aorta measuring 4 cm (7/9/2024).  He is seeing Dr. Cruz.  He has a repeat CT scan scheduled for today.     SOB, patient reports is completely resolved after his A-fib ablation procedure.     Follow-up with Dr. Bhatti in 1 year.     Yvette Zuniga, BETZY-CNP

## 2025-07-14 NOTE — PATIENT INSTRUCTIONS
Goal LDL <70 and closer to 55.  Your LDL was 76 on 5/21/2025.  Discussed the importance of lifestyle modifications, weight loss, low saturated fat, low sodium diet and exercising for 30 mins/day.      Follow-up with Dr. Bhatti in 1 year.

## 2025-07-22 ENCOUNTER — APPOINTMENT (OUTPATIENT)
Dept: CARDIAC SURGERY | Facility: CLINIC | Age: 75
End: 2025-07-22
Payer: MEDICARE

## 2025-08-26 ENCOUNTER — TELEMEDICINE (OUTPATIENT)
Dept: CARDIAC SURGERY | Facility: CLINIC | Age: 75
End: 2025-08-26
Payer: MEDICARE

## 2025-08-26 DIAGNOSIS — I71.21 ANEURYSM OF ASCENDING AORTA WITHOUT RUPTURE: Primary | ICD-10-CM

## 2025-08-26 ASSESSMENT — LIFESTYLE VARIABLES
SKIP TO QUESTIONS 9-10: 1
HOW MANY STANDARD DRINKS CONTAINING ALCOHOL DO YOU HAVE ON A TYPICAL DAY: 1 OR 2
HOW OFTEN DO YOU HAVE A DRINK CONTAINING ALCOHOL: 4 OR MORE TIMES A WEEK
HOW OFTEN DO YOU HAVE SIX OR MORE DRINKS ON ONE OCCASION: NEVER
AUDIT-C TOTAL SCORE: 4

## 2025-08-26 ASSESSMENT — ENCOUNTER SYMPTOMS
WHEEZING: 0
DEPRESSION: 0
COUGH: 0
NEAR-SYNCOPE: 0
OCCASIONAL FEELINGS OF UNSTEADINESS: 0
LOSS OF SENSATION IN FEET: 0
DYSPNEA ON EXERTION: 0
DECREASED APPETITE: 0

## 2025-08-27 DIAGNOSIS — I71.20 THORACIC AORTIC ANEURYSM, WITHOUT RUPTURE, UNSPECIFIED: ICD-10-CM

## 2025-08-27 RX ORDER — EZETIMIBE 10 MG/1
10 TABLET ORAL
Qty: 90 TABLET | Refills: 3 | Status: SHIPPED | OUTPATIENT
Start: 2025-08-27

## 2025-09-03 ENCOUNTER — OFFICE VISIT (OUTPATIENT)
Dept: PRIMARY CARE | Facility: CLINIC | Age: 75
End: 2025-09-03
Payer: MEDICARE

## 2025-09-03 ENCOUNTER — HOSPITAL ENCOUNTER (OUTPATIENT)
Dept: RADIOLOGY | Facility: HOSPITAL | Age: 75
Discharge: HOME | End: 2025-09-03
Payer: MEDICARE

## 2025-09-03 VITALS
WEIGHT: 232 LBS | BODY MASS INDEX: 29.77 KG/M2 | SYSTOLIC BLOOD PRESSURE: 154 MMHG | DIASTOLIC BLOOD PRESSURE: 94 MMHG | HEIGHT: 74 IN

## 2025-09-03 DIAGNOSIS — R41.82 ALTERED MENTAL STATUS, UNSPECIFIED ALTERED MENTAL STATUS TYPE: ICD-10-CM

## 2025-09-03 DIAGNOSIS — R41.82 ALTERED MENTAL STATUS, UNSPECIFIED ALTERED MENTAL STATUS TYPE: Primary | ICD-10-CM

## 2025-09-03 DIAGNOSIS — I42.9 CARDIOMYOPATHY, UNSPECIFIED TYPE (MULTI): ICD-10-CM

## 2025-09-03 PROCEDURE — 70450 CT HEAD/BRAIN W/O DYE: CPT

## 2025-09-05 DIAGNOSIS — R53.83 LETHARGY: ICD-10-CM

## 2025-09-05 DIAGNOSIS — R41.82 ALTERED MENTAL STATUS, UNSPECIFIED ALTERED MENTAL STATUS TYPE: Primary | ICD-10-CM

## 2025-09-05 LAB
ALBUMIN SERPL-MCNC: 4.2 G/DL (ref 3.6–5.1)
ALP SERPL-CCNC: 70 U/L (ref 35–144)
ALT SERPL-CCNC: 27 U/L (ref 9–46)
ANION GAP SERPL CALCULATED.4IONS-SCNC: 10 MMOL/L (CALC) (ref 7–17)
APPEARANCE UR: CLEAR
AST SERPL-CCNC: 54 U/L (ref 10–35)
BACTERIA #/AREA URNS HPF: ABNORMAL /HPF
BACTERIA UR CULT: NORMAL
BILIRUB SERPL-MCNC: 1.3 MG/DL (ref 0.2–1.2)
BILIRUB UR QL STRIP: NEGATIVE
BUN SERPL-MCNC: 18 MG/DL (ref 7–25)
CALCIUM SERPL-MCNC: 9.4 MG/DL (ref 8.6–10.3)
CHLORIDE SERPL-SCNC: 101 MMOL/L (ref 98–110)
CO2 SERPL-SCNC: 26 MMOL/L (ref 20–32)
COLOR UR: ABNORMAL
CREAT SERPL-MCNC: 0.92 MG/DL (ref 0.7–1.28)
EGFRCR SERPLBLD CKD-EPI 2021: 88 ML/MIN/1.73M2
ERYTHROCYTE [DISTWIDTH] IN BLOOD BY AUTOMATED COUNT: 12.8 % (ref 11–15)
GLUCOSE SERPL-MCNC: 101 MG/DL (ref 65–99)
GLUCOSE UR QL STRIP: NEGATIVE
HCT VFR BLD AUTO: 43.2 % (ref 38.5–50)
HGB BLD-MCNC: 14.8 G/DL (ref 13.2–17.1)
HGB UR QL STRIP: ABNORMAL
HYALINE CASTS #/AREA URNS LPF: ABNORMAL /LPF
KETONES UR QL STRIP: NEGATIVE
LEUKOCYTE ESTERASE UR QL STRIP: ABNORMAL
MCH RBC QN AUTO: 33.7 PG (ref 27–33)
MCHC RBC AUTO-ENTMCNC: 34.3 G/DL (ref 32–36)
MCV RBC AUTO: 98.4 FL (ref 80–100)
NITRITE UR QL STRIP: NEGATIVE
PH UR STRIP: 6.5 [PH] (ref 5–8)
PLATELET # BLD AUTO: 159 THOUSAND/UL (ref 140–400)
PMV BLD REES-ECKER: 10.5 FL (ref 7.5–12.5)
POTASSIUM SERPL-SCNC: 4.7 MMOL/L (ref 3.5–5.3)
PROT SERPL-MCNC: 6.9 G/DL (ref 6.1–8.1)
PROT UR QL STRIP: ABNORMAL
RBC # BLD AUTO: 4.39 MILLION/UL (ref 4.2–5.8)
RBC #/AREA URNS HPF: ABNORMAL /HPF
SERVICE CMNT-IMP: ABNORMAL
SODIUM SERPL-SCNC: 137 MMOL/L (ref 135–146)
SP GR UR STRIP: 1.02 (ref 1–1.03)
SQUAMOUS #/AREA URNS HPF: ABNORMAL /HPF
WBC # BLD AUTO: 10.3 THOUSAND/UL (ref 3.8–10.8)
WBC #/AREA URNS HPF: ABNORMAL /HPF

## 2025-12-11 ENCOUNTER — APPOINTMENT (OUTPATIENT)
Dept: PRIMARY CARE | Facility: CLINIC | Age: 75
End: 2025-12-11
Payer: MEDICARE